# Patient Record
Sex: MALE | Race: WHITE
[De-identification: names, ages, dates, MRNs, and addresses within clinical notes are randomized per-mention and may not be internally consistent; named-entity substitution may affect disease eponyms.]

---

## 2018-02-26 ENCOUNTER — HOSPITAL ENCOUNTER (EMERGENCY)
Dept: HOSPITAL 45 - C.EDB | Age: 73
LOS: 1 days | Discharge: HOME | End: 2018-02-27
Payer: COMMERCIAL

## 2018-02-26 VITALS
WEIGHT: 170.64 LBS | BODY MASS INDEX: 27.42 KG/M2 | WEIGHT: 170.64 LBS | HEIGHT: 65.98 IN | BODY MASS INDEX: 27.42 KG/M2 | HEIGHT: 65.98 IN

## 2018-02-26 VITALS — OXYGEN SATURATION: 94 %

## 2018-02-26 VITALS — TEMPERATURE: 97.52 F

## 2018-02-26 DIAGNOSIS — R05: ICD-10-CM

## 2018-02-26 DIAGNOSIS — R09.02: ICD-10-CM

## 2018-02-26 DIAGNOSIS — R07.9: ICD-10-CM

## 2018-02-26 DIAGNOSIS — R06.02: ICD-10-CM

## 2018-02-26 DIAGNOSIS — Z87.891: ICD-10-CM

## 2018-02-26 DIAGNOSIS — Z79.899: ICD-10-CM

## 2018-02-26 DIAGNOSIS — R00.1: ICD-10-CM

## 2018-02-26 DIAGNOSIS — I25.2: ICD-10-CM

## 2018-02-26 DIAGNOSIS — J44.1: Primary | ICD-10-CM

## 2018-02-26 DIAGNOSIS — Z88.9: ICD-10-CM

## 2018-02-26 DIAGNOSIS — Z82.49: ICD-10-CM

## 2018-02-26 DIAGNOSIS — Z95.810: ICD-10-CM

## 2018-02-26 DIAGNOSIS — Z83.3: ICD-10-CM

## 2018-02-26 DIAGNOSIS — Z79.02: ICD-10-CM

## 2018-02-26 LAB
ALBUMIN SERPL-MCNC: 3.4 GM/DL (ref 3.4–5)
ALP SERPL-CCNC: 85 U/L (ref 45–117)
ALT SERPL-CCNC: 51 U/L (ref 12–78)
AST SERPL-CCNC: 27 U/L (ref 15–37)
BASOPHILS # BLD: 0.02 K/UL (ref 0–0.2)
BASOPHILS NFR BLD: 0.2 %
BUN SERPL-MCNC: 11 MG/DL (ref 7–18)
CALCIUM SERPL-MCNC: 8.7 MG/DL (ref 8.5–10.1)
CO2 SERPL-SCNC: 29 MMOL/L (ref 21–32)
CREAT SERPL-MCNC: 1.04 MG/DL (ref 0.6–1.4)
EOS ABS #: 0.42 K/UL (ref 0–0.5)
EOSINOPHIL NFR BLD AUTO: 115 K/UL (ref 130–400)
GLUCOSE SERPL-MCNC: 68 MG/DL (ref 70–99)
HCT VFR BLD CALC: 42.2 % (ref 42–52)
HGB BLD-MCNC: 14.2 G/DL (ref 14–18)
IG#: 0.03 K/UL (ref 0–0.02)
IMM GRANULOCYTES NFR BLD AUTO: 16.1 %
INR PPP: 1.1 (ref 0.9–1.1)
LYMPHOCYTES # BLD: 1.57 K/UL (ref 1.2–3.4)
MCH RBC QN AUTO: 30.9 PG (ref 25–34)
MCHC RBC AUTO-ENTMCNC: 33.6 G/DL (ref 32–36)
MCV RBC AUTO: 91.7 FL (ref 80–100)
MONO ABS #: 0.68 K/UL (ref 0.11–0.59)
MONOCYTES NFR BLD: 7 %
NEUT ABS #: 7.03 K/UL (ref 1.4–6.5)
NEUTROPHILS # BLD AUTO: 4.3 %
NEUTROPHILS NFR BLD AUTO: 72.1 %
PMV BLD AUTO: 11.5 FL (ref 7.4–10.4)
POTASSIUM SERPL-SCNC: 3.3 MMOL/L (ref 3.5–5.1)
PROT SERPL-MCNC: 6.5 GM/DL (ref 6.4–8.2)
PTT PATIENT: 27.1 SECONDS (ref 21–31)
RED CELL DISTRIBUTION WIDTH CV: 14.5 % (ref 11.5–14.5)
RED CELL DISTRIBUTION WIDTH SD: 47 FL (ref 36.4–46.3)
SODIUM SERPL-SCNC: 139 MMOL/L (ref 136–145)
WBC # BLD AUTO: 9.75 K/UL (ref 4.8–10.8)

## 2018-02-26 NOTE — DIAGNOSTIC IMAGING REPORT
CHEST ONE VIEW PORTABLE



HISTORY: Atypical chest pain.  short of breath



COMPARISON: 11/10/2016.



FINDINGS: The cardiac silhouette is borderline enlarged. Left-sided

pacemaker/defibrillator. Mild central pulmonary vascular congestion without

overt edema. This remains unchanged. No new focal lung consolidations. No

pleural fusions. No pneumothorax.



IMPRESSION:

Mild central pulmonary vascular congestion without overt edema. This is not

significantly changed.







Electronically signed by:  Calixto Harvey M.D.

2/26/2018 9:57 PM



Dictated Date/Time:  2/26/2018 9:52 PM

## 2018-02-27 VITALS — DIASTOLIC BLOOD PRESSURE: 72 MMHG | HEART RATE: 88 BPM | OXYGEN SATURATION: 92 % | SYSTOLIC BLOOD PRESSURE: 134 MMHG

## 2018-02-27 NOTE — EMERGENCY ROOM VISIT NOTE
History


Report prepared by Yoli:  Brandon Matson


Under the Supervision of:  Dr. David Salinas M.D.


First contact with patient:  21:35


Chief Complaint:  SHORTNESS OF BREATH


Stated Complaint:  CHEST PAIN


Nursing Triage Summary:  


patient c/o increased SOB today along with cough/congestion. SOB worsens with 


activity and when lying flat. hx pacemaker/defibrillator and patient thinks he 


was diagnosed with COPD and does not use any home oxygen.





History of Present Illness


The patient is a 72 year old male who presents to the Emergency Room with 

complaints of constant SOB beginning tonight. The patient states that he is a 

former smoker but does not wear oxygen at home. He also complains of congestion 

and a cough. He notes that his chest feels tight. He reports that he has a 

nebulizer at home which typically relieves his symptoms. Per nurse, the patient 

was given an albuterol treatment upon arrival. The patient states that he is 

currently feeling better. He notes that he has a defibrillator in place and has 

a history of a heart attack. Pt denies LOC, headache, fevers, chills, 

diaphoresis, visual changes, neck pain, tearing pain radiating to the back, 

personal history or family history of aneurysm , uncontrolled hypertension, leg 

swelling, coagulation abnormalities, prolonged travel, recent surgery or 

immobilization, nausea, vomiting, abdominal pain, melena, hematochezia, urinary 

symptoms, numbness, weakness, lymphadenopathy, rash, or other complaints.





   Source of History:  patient


   Onset:  tonight


   Position:  chest


   Quality:  other (SOB, tight)


   Timing:  constant


   Modifying Factors (Relieving):  other (albuterol treatment)


   Associated Symptoms:  + cough


Note:


He also complains of congestion.





Review of Systems


See HPI for pertinent positives and negatives.  A total of ten systems were 

reviewed and were otherwise negative.





Past Medical & Surgical


Medical Problems:


(1) Acute respiratory failure


(2) Emphysema lung


(3) H/O cardiac pacemaker


(4) Heart attack


(5) Heart disease


(6) History of - hypertension








Family History





Diabetes mellitus


FH: heart disease


Hypertension





Social History


Smoking Status:  Current Every Day Smoker


Alcohol Use:  none


Drug Use:  none


Housing Status:  lives with family


Occupation Status:  employed





Current/Historical Medications


Scheduled


Carvedilol (Coreg), 25 MG PO BID


Clopidogrel (Plavix), 75 MG PO DAILY


Digoxin (Digoxin), 0.125 MG PO DAILY


Furosemide (Lasix), 20 MG PO PRN


Isosorbide Mononitrate Ext Rel (Imdur Ext Rel), 60 MG PO qam & noon


Lisinopril (Zestril), 20 MG PO daily @ 1200


Nitroglycerin (Nitrostat), 0.4 MG UT PRN


Omeprazole (Prilosec), 1 TAB PO DAILY


Prednisone (Prednisone), 50 MG PO DAILY


Rosuvastatin Calcium (Crestor), 10 MG PO DAILY





Scheduled PRN


Albuterol Sulf (Proventil 0.083% 2.5MG/3ML), 2.5 MG INH QID PRN for SOB/Wheezing





Allergies


Coded Allergies:  


     Simvastatin (Verified  Adverse Reaction, Mild, MYALGIAS, 2/5/10)


     No Known Allergies (Verified , 2/16/07)





Physical Exam


Vital Signs











  Date Time  Temp Pulse Resp B/P (MAP) Pulse Ox O2 Delivery O2 Flow Rate FiO2


 


2/27/18 00:38  88 22 134/72 92   


 


2/26/18 23:24     94 Room Air  


 


2/26/18 23:11  75 22 135/64 90 Room Air  


 


2/26/18 23:08  53      


 


2/26/18 22:36     93 Room Air  


 


2/26/18 22:09     91 Room Air  


 


2/26/18 22:08     89 Room Air  


 


2/26/18 21:56  57 20 127/84 92 Room Air  


 


2/26/18 21:33     93 Room Air  


 


2/26/18 21:10     93 Room Air  


 


2/26/18 20:57 36.4 56 20 126/69 93 Room Air  











Physical Exam


GENERAL: Awake, alert,  dyspneic appearing, in no distress


HENT: Normocephalic, atraumatic. Oropharynx unremarkable.


EYES: Normal conjunctiva. Sclera non-icteric.


NECK: Supple. No nuchal rigidity. FROM. No masses.


RESPIRATORY: Clear to auscultation. No wheezes. Scattered rhonchi.


CARDIAC: Borderline bradycardic.  Normal rhythm. No murmurs.  No rubs. 

Extremities warm and well perfused. Pulses equal.  No JVD.


GI: Soft, non-distended. No tenderness to palpation. No rebound or guarding. No 

masses.


RECTAL: Deferred.


MUSCULOSKELETAL: Atraumatic. Chest examination reveals no tenderness. The back 

is symmetrical on inspection without obvious abnormality. There is no CVA 

tenderness to palpation. No joint edema. 


LOWER EXTREMITIES: Calves are equal size bilaterally and non-tender. No edema. 

No discoloration. 


NEURO: Normal sensorium. No sensory or motor deficits noted. 


SKIN: No rash or jaundice noted.





Medical Decision & Procedures


ER Provider


Diagnostic Interpretation:


Radiology results as stated below per my review and radiologist interpretation: 





CHEST ONE VIEW PORTABLE





HISTORY: Atypical chest pain.  short of breath





COMPARISON: 11/10/2016.





FINDINGS: The cardiac silhouette is borderline enlarged. Left-sided


pacemaker/defibrillator. Mild central pulmonary vascular congestion without


overt edema. This remains unchanged. No new focal lung consolidations. No


pleural fusions. No pneumothorax.





IMPRESSION:


Mild central pulmonary vascular congestion without overt edema. This is not


significantly changed.





Electronically signed by:  Calixto Harvey M.D.


2/26/2018 9:57 PM





Laboratory Results


2/26/18 21:24








Red Blood Count 4.60, Mean Corpuscular Volume 91.7, Mean Corpuscular Hemoglobin 

30.9, Mean Corpuscular Hemoglobin Concent 33.6, Mean Platelet Volume 11.5, 

Neutrophils (%) (Auto) 72.1, Lymphocytes (%) (Auto) 16.1, Monocytes (%) (Auto) 

7.0, Eosinophils (%) (Auto) 4.3, Basophils (%) (Auto) 0.2, Neutrophils # (Auto) 

7.03, Lymphocytes # (Auto) 1.57, Monocytes # (Auto) 0.68, Eosinophils # (Auto) 

0.42, Basophils # (Auto) 0.02





2/26/18 21:24








2/26/18 22:45

















Test


  2/26/18


21:24 2/26/18


21:37 2/26/18


22:45 2/26/18


23:23


 


White Blood Count


  9.75 K/uL


(4.8-10.8) 


  


  


 


 


Red Blood Count


  4.60 M/uL


(4.7-6.1) 


  


  


 


 


Hemoglobin


  14.2 g/dL


(14.0-18.0) 


  


  


 


 


Hematocrit 42.2 % (42-52)    


 


Mean Corpuscular Volume


  91.7 fL


() 


  


  


 


 


Mean Corpuscular Hemoglobin


  30.9 pg


(25-34) 


  


  


 


 


Mean Corpuscular Hemoglobin


Concent 33.6 g/dl


(32-36) 


  


  


 


 


Platelet Count


  115 K/uL


(130-400) 


  


  


 


 


Mean Platelet Volume


  11.5 fL


(7.4-10.4) 


  


  


 


 


Neutrophils (%) (Auto) 72.1 %    


 


Lymphocytes (%) (Auto) 16.1 %    


 


Monocytes (%) (Auto) 7.0 %    


 


Eosinophils (%) (Auto) 4.3 %    


 


Basophils (%) (Auto) 0.2 %    


 


Neutrophils # (Auto)


  7.03 K/uL


(1.4-6.5) 


  


  


 


 


Lymphocytes # (Auto)


  1.57 K/uL


(1.2-3.4) 


  


  


 


 


Monocytes # (Auto)


  0.68 K/uL


(0.11-0.59) 


  


  


 


 


Eosinophils # (Auto)


  0.42 K/uL


(0-0.5) 


  


  


 


 


Basophils # (Auto)


  0.02 K/uL


(0-0.2) 


  


  


 


 


RDW Standard Deviation


  47.0 fL


(36.4-46.3) 


  


  


 


 


RDW Coefficient of Variation


  14.5 %


(11.5-14.5) 


  


  


 


 


Immature Granulocyte % (Auto) 0.3 %    


 


Immature Granulocyte # (Auto)


  0.03 K/uL


(0.00-0.02) 


  


  


 


 


Prothrombin Time


  11.1 SECONDS


(9.0-12.0) 


  


  


 


 


Prothromb Time International


Ratio 1.1 (0.9-1.1) 


  


  


  


 


 


Activated Partial


Thromboplast Time 27.1 SECONDS


(21.0-31.0) 


  


  


 


 


Partial Thromboplastin Ratio 1.0    


 


Anion Gap


  5.0 mmol/L


(3-11) 


  


  


 


 


Est Creatinine Clear Calc


Drug Dose 63.8 ml/min 


  


  


  


 


 


Estimated GFR (


American) 83.3 


  


  


  


 


 


Estimated GFR (Non-


American 71.9 


  


  


  


 


 


BUN/Creatinine Ratio 10.4 (10-20)    


 


Calcium Level


  8.7 mg/dl


(8.5-10.1) 


  


  


 


 


Total Bilirubin


  0.5 mg/dl


(0.2-1) 


  


  


 


 


Alanine Aminotransferase


(ALT/SGPT) 51 U/L (12-78) 


  


  


  


 


 


Alkaline Phosphatase


  85 U/L


() 


  


  


 


 


Total Protein


  6.5 gm/dl


(6.4-8.2) 


  


  


 


 


Albumin


  3.4 gm/dl


(3.4-5.0) 


  


  


 


 


Globulin


  3.1 gm/dl


(2.5-4.0) 


  


  


 


 


Albumin/Globulin Ratio 1.1 (0.9-2)    


 


Bedside Troponin I


  


  < 0.030 ng/ml


(0-0.045) 


  


 


 


Aspartate Amino Transf


(AST/SGOT) 


  


  27 U/L (15-37) 


  


 


 


Bedside Glucose


  


  


  


  82 mg/dl


(70-99)





Laboratory results reviewed by me





Medications Administered











 Medications


  (Trade)  Dose


 Ordered  Sig/Rhys


 Route  Start Time


 Stop Time Status Last Admin


Dose Admin


 


 Albuterol Sulfate


  (Ventolin 0.083%


 2.5MG/3ML Neb)  2.5 mg  NOW  STAT


 INH  2/26/18 21:20


 2/26/18 21:26 DC 2/26/18 21:32


2.5 MG


 


 Methylprednisolone


 Sodium Succinate


  (Solu-Medrol IV)  125 mg  NOW  STAT


 IV  2/26/18 22:06


 2/26/18 22:07 DC 2/26/18 22:11


125 MG


 


 Albuterol/


 Ipratropium


  (Duoneb)  3 ml  NOW  STAT


 INH  2/26/18 22:06


 2/26/18 22:07 DC 2/26/18 22:11


3 ML


 


 Albuterol/


 Ipratropium


  (Duoneb)  3 ml  NOW  STAT


 INH  2/26/18 23:01


 2/26/18 23:02 DC 2/26/18 23:11


3 ML


 


 Prednisone


  (PredniSONE TAB)  60 mg  NOW  STAT


 PO  2/26/18 23:26


 2/26/18 23:27 DC 2/26/18 23:47


60 MG











ECG Per My Interpretation


Indication:  SOB/dyspnea


Rate (beats per minute):  52


Rhythm:  sinus bradycardia


Findings:  Q waves (Anterioseptal), RBBB, no ectopy, other (Left anterior 

fasicular block)





ED Course


2120: Albuterol Sulfate 2.5mg INH





2202: The patient was evaluated in room A9. A complete history and physical 

exam was performed.





2206: DuoNeb 3ml INH, Methylprednisolone Sodium Succinate 125mg IV





2220: I reevaluated the patient. He refuses admission.





2301: DuoNeb 3ml INH





2303: I rechecked the patient. He is 91% on room air. He is getting another 

nebulizer treatment.





2326: Prednisone 60mg PO





0018: I reevaluated and updated the patient. He feels significantly better and 

would like to go home.





0104: I reevaluated the patient. Discussed results and discharge instructions: 

he verbalized understanding and agreement. The patient is ready for discharge.





Medical Decision


Triage Nursing notes reviewed.


The patient's presentation and history were concerning for breathing difficulty.





 Etiologies such as  pneumonia, COPD, reactive airway disease, CHF, cardiac 

ischemia, pulmonary embolism, pneumothorax, musculoskeletal, infections, 

gastrointestinal, as well as others were entertained.  





The patient was evaluated.  He was requiring supplemental oxygen.  He was given 

immobilizer treatments and IV Solu Medrol.  He was feeling much better with 

this.  His blood work was unremarkable.  ECG was negative.  Being that he is 

not oxygen dependent I discussed treatment in the hospital.  The patient 

declined.  He does not want to be admitted.  He would like to have a prednisone 

prescription and follow-up as an outpatient.  He has a nebulizer at home.  

Prior to discharge the patient was maintaining his saturations in the low 90s 

on room air.  I did discuss the risks and benefits about refusing admission.  I 

gave my usual and customary discussion regarding this issue.





The patient has demonstrated no significant defect in the decision-making 

capacity to make choices.  The encounter had a good level of communication with 

language the patient can easily understand.  I feel trust was present and 

conveyed that our action/intentions were the best interest of the patient.  The 

patient was given all relevant information and reiterated the explained risks 

and benefits.  The patient explained the reasoning for refusing treatment 

clearly.  The patient possesses and expresses a set of values and goals, the 

ability to communicate and understand, and an ability to reason and deliberate.

  Despite acting emphatically, attentively and with the utmost patient's the 

patient declined further treatment.  I offered options, negotiated, and 

explored every reasonable choice.  I must respect the patient's autonomy and 

that they feel that their choices are best for them despite the associated 

risks of leaving AGAINST MEDICAL ADVICE.











Medication Reconcilliation


Current Medication List:  was personally reviewed by me





Blood Pressure Screening


Patient's blood pressure:  Elevated blood pressure


Blood pressure disposition:  Elevated BP felt to be situational





Impression





 Primary Impression:  


 COPD exacerbation


 Additional Impressions:  


 Hypoxia


 Left against medical advice





Scribe Attestation


The scribe's documentation has been prepared under my direction and personally 

reviewed by me in its entirety. I confirm that the note above accurately 

reflects all work, treatment, procedures, and medical decision making performed 

by me.





Departure Information


Dispostion


Home / Self-Care





Prescriptions





Prednisone (Prednisone) 50 Mg Tab


50 MG PO DAILY for 4 Days, #4 TAB


   Prov: David Salinas MD         2/27/18





Referrals


Miri Longoria D.O. (PCP)





Forms


HOME CARE DOCUMENTATION FORM,                                                 

               IMPORTANT VISIT INFORMATION





Patient Instructions


My [facility]





Additional Instructions





Albuterol inhaler or nebulizer four times daily for five days, then as needed.





Prednisone 50mg:  Once daily until the prescription is finished.  It is best to 

take this earlier in the day as some patients note occasional difficulty 

falling asleep when taken in the late evening.





Acetaminophen(Tylenol) may be used for fever or pain.  Use 1000mg every six 

hours as needed.  Avoid using more than 4000mg in a 24 hour period.





Rest and drink plenty of fluids. 





Avoid smoke/smoking, fumes, dust, or any triggers in the past that may have 

affected your breathing.





Continue current medications.





Return to the ER for chest pain, difficulty breathing, fevers, vomiting, 

worsening of your condition, or as needed.





Follow up with your primary physician this week for a recheck of your current 

condition.





Problem Qualifiers

## 2018-03-02 ENCOUNTER — HOSPITAL ENCOUNTER (INPATIENT)
Dept: HOSPITAL 45 - C.EDB | Age: 73
LOS: 1 days | Discharge: HOME | DRG: 190 | End: 2018-03-03
Attending: HOSPITALIST | Admitting: HOSPITALIST
Payer: COMMERCIAL

## 2018-03-02 VITALS
SYSTOLIC BLOOD PRESSURE: 141 MMHG | HEART RATE: 68 BPM | DIASTOLIC BLOOD PRESSURE: 65 MMHG | TEMPERATURE: 98.42 F | OXYGEN SATURATION: 93 %

## 2018-03-02 VITALS
HEIGHT: 66 IN | BODY MASS INDEX: 24.41 KG/M2 | WEIGHT: 151.9 LBS | WEIGHT: 151.9 LBS | BODY MASS INDEX: 24.41 KG/M2 | BODY MASS INDEX: 24.41 KG/M2 | HEIGHT: 66 IN

## 2018-03-02 VITALS
SYSTOLIC BLOOD PRESSURE: 136 MMHG | DIASTOLIC BLOOD PRESSURE: 71 MMHG | OXYGEN SATURATION: 93 % | HEART RATE: 63 BPM | TEMPERATURE: 98.06 F

## 2018-03-02 VITALS
SYSTOLIC BLOOD PRESSURE: 118 MMHG | HEART RATE: 64 BPM | DIASTOLIC BLOOD PRESSURE: 65 MMHG | OXYGEN SATURATION: 95 % | TEMPERATURE: 97.88 F

## 2018-03-02 VITALS — HEART RATE: 57 BPM | SYSTOLIC BLOOD PRESSURE: 143 MMHG | TEMPERATURE: 97.34 F | DIASTOLIC BLOOD PRESSURE: 68 MMHG

## 2018-03-02 VITALS — OXYGEN SATURATION: 91 %

## 2018-03-02 VITALS — OXYGEN SATURATION: 93 %

## 2018-03-02 VITALS — HEART RATE: 79 BPM | OXYGEN SATURATION: 93 %

## 2018-03-02 DIAGNOSIS — I25.2: ICD-10-CM

## 2018-03-02 DIAGNOSIS — E78.5: ICD-10-CM

## 2018-03-02 DIAGNOSIS — F17.200: ICD-10-CM

## 2018-03-02 DIAGNOSIS — T38.0X5A: ICD-10-CM

## 2018-03-02 DIAGNOSIS — D69.3: ICD-10-CM

## 2018-03-02 DIAGNOSIS — I25.5: ICD-10-CM

## 2018-03-02 DIAGNOSIS — E87.6: ICD-10-CM

## 2018-03-02 DIAGNOSIS — J44.1: Primary | ICD-10-CM

## 2018-03-02 DIAGNOSIS — Z79.899: ICD-10-CM

## 2018-03-02 DIAGNOSIS — Z95.5: ICD-10-CM

## 2018-03-02 DIAGNOSIS — R09.02: ICD-10-CM

## 2018-03-02 DIAGNOSIS — I11.0: ICD-10-CM

## 2018-03-02 DIAGNOSIS — I50.43: ICD-10-CM

## 2018-03-02 DIAGNOSIS — Z95.810: ICD-10-CM

## 2018-03-02 DIAGNOSIS — Z83.3: ICD-10-CM

## 2018-03-02 DIAGNOSIS — Z82.49: ICD-10-CM

## 2018-03-02 DIAGNOSIS — Z88.8: ICD-10-CM

## 2018-03-02 LAB
ALBUMIN SERPL-MCNC: 3 GM/DL (ref 3.4–5)
ALP SERPL-CCNC: 76 U/L (ref 45–117)
ALT SERPL-CCNC: 67 U/L (ref 12–78)
AST SERPL-CCNC: 45 U/L (ref 15–37)
BASOPHILS # BLD: 0 K/UL (ref 0–0.2)
BASOPHILS NFR BLD: 0 %
BUN SERPL-MCNC: 19 MG/DL (ref 7–18)
CALCIUM SERPL-MCNC: 8.4 MG/DL (ref 8.5–10.1)
CO2 SERPL-SCNC: 29 MMOL/L (ref 21–32)
CREAT SERPL-MCNC: 1.17 MG/DL (ref 0.6–1.4)
EOS ABS #: 0.01 K/UL (ref 0–0.5)
EOSINOPHIL NFR BLD AUTO: 113 K/UL (ref 130–400)
GLUCOSE SERPL-MCNC: 80 MG/DL (ref 70–99)
HCT VFR BLD CALC: 40.8 % (ref 42–52)
HGB BLD-MCNC: 13.8 G/DL (ref 14–18)
IG#: 0.05 K/UL (ref 0–0.02)
IMM GRANULOCYTES NFR BLD AUTO: 6.5 %
INR PPP: 1.1 (ref 0.9–1.1)
LYMPHOCYTES # BLD: 0.64 K/UL (ref 1.2–3.4)
MCH RBC QN AUTO: 31.3 PG (ref 25–34)
MCHC RBC AUTO-ENTMCNC: 33.8 G/DL (ref 32–36)
MCV RBC AUTO: 92.5 FL (ref 80–100)
MONO ABS #: 0.63 K/UL (ref 0.11–0.59)
MONOCYTES NFR BLD: 6.4 %
NEUT ABS #: 8.45 K/UL (ref 1.4–6.5)
NEUTROPHILS # BLD AUTO: 0.1 %
NEUTROPHILS NFR BLD AUTO: 86.5 %
PMV BLD AUTO: 11.4 FL (ref 7.4–10.4)
POTASSIUM SERPL-SCNC: 3.3 MMOL/L (ref 3.5–5.1)
POTASSIUM SERPL-SCNC: 3.3 MMOL/L (ref 3.5–5.1)
PROT SERPL-MCNC: 5.8 GM/DL (ref 6.4–8.2)
PTT PATIENT: 25.5 SECONDS (ref 21–31)
RED CELL DISTRIBUTION WIDTH CV: 15.1 % (ref 11.5–14.5)
RED CELL DISTRIBUTION WIDTH SD: 50.7 FL (ref 36.4–46.3)
SODIUM SERPL-SCNC: 143 MMOL/L (ref 136–145)
WBC # BLD AUTO: 9.78 K/UL (ref 4.8–10.8)

## 2018-03-02 RX ADMIN — ALBUTEROL SULFATE SCH MG: 2.5 SOLUTION RESPIRATORY (INHALATION) at 15:24

## 2018-03-02 RX ADMIN — NITROGLYCERIN SCH INCH: 20 OINTMENT TOPICAL at 14:00

## 2018-03-02 RX ADMIN — CARVEDILOL SCH MG: 25 TABLET, FILM COATED ORAL at 20:30

## 2018-03-02 RX ADMIN — LISINOPRIL SCH MG: 20 TABLET ORAL at 17:41

## 2018-03-02 RX ADMIN — ALBUTEROL SULFATE SCH MG: 2.5 SOLUTION RESPIRATORY (INHALATION) at 19:16

## 2018-03-02 RX ADMIN — Medication SCH MG: at 20:30

## 2018-03-02 RX ADMIN — NITROGLYCERIN SCH INCH: 20 OINTMENT TOPICAL at 20:19

## 2018-03-02 RX ADMIN — POTASSIUM CHLORIDE SCH MEQ: 1500 TABLET, EXTENDED RELEASE ORAL at 20:30

## 2018-03-02 RX ADMIN — GUAIFENESIN SCH MG: 600 TABLET, EXTENDED RELEASE ORAL at 20:30

## 2018-03-02 RX ADMIN — ALBUTEROL SULFATE SCH MG: 2.5 SOLUTION RESPIRATORY (INHALATION) at 15:25

## 2018-03-02 NOTE — EMERGENCY ROOM VISIT NOTE
History


Report prepared by Yoli:  Faustino Uribe


Under the Supervision of:  Dr. Blas Tanner D.O.


First contact with patient:  09:05


Chief Complaint:  SHORTNESS OF BREATH


Stated Complaint:  TROUBLE BREATHING


Nursing Triage Summary:  


Pt states seen here Mon night. Pt states hx of COPD. Sent home on prednisone 

and 


has been using neb. This morning approx 0400 breathing worsened when he went 

out 


to feed the horses.





History of Present Illness


The patient is a 72 year old male who presents to the Emergency Room with 

complaints of chest pain and shortness of breath.  The patient was seen in our 

facility last week for similar complaints.  At that time he was diagnosed with 

a COPD exacerbation.  He was started on steroids which he took the last dose of 

today.  He states his symptoms were significantly improved however last night 

into this morning he started having worsening symptoms again.  He complains of 

cough which is nonproductive.  He states his symptoms are moderate especially 

with any exertion.  He denies any orthopnea or hemoptysis.  He denies having 

any leg swelling but states he had some thigh pain last evening across the 

right thigh but this pain is gone now.  He does not have a history of venous 

thromboembolic disease.  He did not see his primary care physician today.  He 

states that he used his inhaler today.  He states that this helped his symptoms 

somewhat.  He does continue to smoke tobacco products at this time.





When the patient was at our facility last week the emergency department 

physician who evaluated him recommended admission because of his fluctuations 

in oxygen saturation.  The patient did not wish to stay in the hospital at that 

time and I do believe signed out AGAINST MEDICAL ADVICE.





   Source of History:  patient


   Onset:  Last week


   Position:  chest


   Timing:  other (Persistent)


   Associated Symptoms:  + SOB





Review of Systems


See HPI for pertinent positives & negatives. A total of 10 systems reviewed and 

were otherwise negative.





Past Medical & Surgical


Medical Problems:


(1) Acute on chronic systolic (congestive) heart failure


(2) Acute respiratory failure


(3) Emphysema lung


(4) H/O cardiac pacemaker


(5) Heart attack


(6) Heart disease


(7) History of - hypertension








Family History





Diabetes mellitus


FH: heart disease


Hypertension





Social History


Smoking Status:  Current Every Day Smoker


Alcohol Use:  none


Drug Use:  none


Housing Status:  lives with family


Occupation Status:  employed





Current/Historical Medications


Scheduled


Carvedilol (Coreg), 25 MG PO BID


Clopidogrel (Plavix), 75 MG PO DAILY


Digoxin (Digoxin), 0.125 MG PO DAILY


Furosemide (Lasix), 20 MG PO PRN


Isosorbide Mononitrate Ext Rel (Imdur Ext Rel), 60 MG PO qam & noon


Lisinopril (Zestril), 20 MG PO daily @ 1200


Nitroglycerin (Nitrostat), 0.4 MG UT PRN


Omeprazole (Prilosec), 1 TAB PO DAILY


Rosuvastatin Calcium (Crestor), 10 MG PO DAILY





Scheduled PRN


Albuterol Sulf (Proventil 0.083% 2.5MG/3ML), 2.5 MG INH QID PRN for SOB/Wheezing





Allergies


Coded Allergies:  


     Simvastatin (Verified  Adverse Reaction, Mild, MYALGIAS, 3/2/18)





Physical Exam


Vital Signs











  Date Time  Temp Pulse Resp B/P (MAP) Pulse Ox O2 Delivery O2 Flow Rate FiO2


 


3/2/18 11:11  67 18 166/82 93 Nasal Cannula 2.0 


 


3/2/18 10:11     94 Nasal Cannula 2.0 


 


3/2/18 10:10     88 Room Air  


 


3/2/18 09:29  65      


 


3/2/18 09:25     95 Room Air  


 


3/2/18 09:01 36.9 72 20 160/81 94 Room Air  











Physical Exam


GENERAL:  Patient is awake alert in no acute distress patient is resting 

comfortably and showing no signs of anxiety


EYES: The conjunctivae are clear.  The pupils are round and reactive. 


EARS, NOSE, MOUTH AND THROAT: The nose is without any evidence of any 

deformity. Mucous membranes are moist tongue is midline 


NECK: The neck is nontender and supple.


RESPIRATORY: No conversational dyspnea or tachypnea was noted.  There are 

diminished breath sounds noted throughout.  There are expiratory wheezing in 

both upper lung fields.  No retractions were noted.


CARDIOVASCULAR:  Regular rate and rhythm noted there no murmurs rubs or gallops 

normal S1 normal S2 


GASTROINTESTINAL: The abdomen is soft. Bowel sounds are present in all 

quadrants. Abdomen is nontender


MUSCULOSKELETAL/EXTREMITIES: There is no evidence of gross deformity full range 

of motion is noted in the hips and shoulders


SKIN: There is no obvious evidence of any rash.  There is no calf tenderness.


NEUROLOGIC:  Patient is awake alert and oriented x3.  Gait was steady.





Medical Decision & Procedures


ER Provider


Diagnostic Interpretation:


Radiology results as stated below per my review and radiologist interpretation:





CT ANGIOGRAM OF THE CHEST





CLINICAL HISTORY: Atypical chest pain and shortness of breath    





COMPARISON STUDY:  Chest x-ray dated 3-18, CT scan dated 11/10/2016 





TECHNIQUE: Following the IV administration of 94 mL of Optiray-320, CT angiogram


of the thorax was performed from the thoracic inlet to the lung bases utilizing


the pulmonary embolus protocol. Images are reviewed in the axial, sagittal, and


coronal planes. IV contrast was administered without complication. MIP imaging


was performed.  A dose lowering technique was utilized adhering to the


principles of ALARA.








CT DOSE: 315.18 mGy.cm





FINDINGS:





There are mildly enlarged paratracheal, AP window, subcarinal, and right hilar


lymph nodes.





There was no evidence of thoracic aortic dilatation. There are coronary artery


calcifications present.





There were no pulmonary artery filling defects to indicate acute pulmonary


embolism.





There are small bilateral pleural effusions





There is moderate respiratory motion artifact. There is interlobular septal


edema. There is mild perihilar bronchial wall thickening. There are no areas of


consolidation to indicate a pneumonia.





IMPRESSION:  


1. Examination limited due to respiratory motion artifact


2. No evidence of acute pulmonary embolism


3. CT findings suggestive of pulmonary edema with small bilateral pleural


effusions and interlobular septal edema


4. Mild perihilar bronchial wall thickening


5. Mild mediastinal and right hilar adenopathy











Electronically signed by:  Puneet Parish M.D.


3/2/2018 10:49 AM





Dictated Date/Time:  3/2/2018 10:45 AM











CHEST ONE VIEW PORTABLE





CLINICAL HISTORY: EVALUATE RESPIRATORY DISTRESS.DYSPNEA    





COMPARISON STUDY:  Chest radiograph February 26, 2018.





FINDINGS: A single lead left subclavian pacer/AICD is in place. There is no


pneumothorax. There are trace bilateral pleural effusions. Interstitial edema


has developed since previous exam of February 26, 2018. Moderate cardiomegaly is


noted. 





IMPRESSION: Interval development of interstitial pulmonary edema. Trace


bilateral pleural effusions.











Electronically signed by:  Jose Carlos Vallejo M.D.


3/2/2018 9:40 AM





Dictated Date/Time:  3/2/2018 9:38 AM





Laboratory Results


3/2/18 09:20








Red Blood Count 4.41, Mean Corpuscular Volume 92.5, Mean Corpuscular Hemoglobin 

31.3, Mean Corpuscular Hemoglobin Concent 33.8, Mean Platelet Volume 11.4, 

Neutrophils (%) (Auto) 86.5, Lymphocytes (%) (Auto) 6.5, Monocytes (%) (Auto) 

6.4, Eosinophils (%) (Auto) 0.1, Basophils (%) (Auto) 0.0, Neutrophils # (Auto) 

8.45, Lymphocytes # (Auto) 0.64, Monocytes # (Auto) 0.63, Eosinophils # (Auto) 

0.01, Basophils # (Auto) 0.00





3/2/18 09:20

















Test


  3/2/18


09:20 3/2/18


09:35 3/2/18


11:50


 


White Blood Count


  9.78 K/uL


(4.8-10.8) 


  


 


 


Red Blood Count


  4.41 M/uL


(4.7-6.1) 


  


 


 


Hemoglobin


  13.8 g/dL


(14.0-18.0) 


  


 


 


Hematocrit 40.8 % (42-52)   


 


Mean Corpuscular Volume


  92.5 fL


() 


  


 


 


Mean Corpuscular Hemoglobin


  31.3 pg


(25-34) 


  


 


 


Mean Corpuscular Hemoglobin


Concent 33.8 g/dl


(32-36) 


  


 


 


Platelet Count


  113 K/uL


(130-400) 


  


 


 


Mean Platelet Volume


  11.4 fL


(7.4-10.4) 


  


 


 


Neutrophils (%) (Auto) 86.5 %   


 


Lymphocytes (%) (Auto) 6.5 %   


 


Monocytes (%) (Auto) 6.4 %   


 


Eosinophils (%) (Auto) 0.1 %   


 


Basophils (%) (Auto) 0.0 %   


 


Neutrophils # (Auto)


  8.45 K/uL


(1.4-6.5) 


  


 


 


Lymphocytes # (Auto)


  0.64 K/uL


(1.2-3.4) 


  


 


 


Monocytes # (Auto)


  0.63 K/uL


(0.11-0.59) 


  


 


 


Eosinophils # (Auto)


  0.01 K/uL


(0-0.5) 


  


 


 


Basophils # (Auto)


  0.00 K/uL


(0-0.2) 


  


 


 


RDW Standard Deviation


  50.7 fL


(36.4-46.3) 


  


 


 


RDW Coefficient of Variation


  15.1 %


(11.5-14.5) 


  


 


 


Immature Granulocyte % (Auto) 0.5 %   


 


Immature Granulocyte # (Auto)


  0.05 K/uL


(0.00-0.02) 


  


 


 


Prothrombin Time


  11.1 SECONDS


(9.0-12.0) 


  


 


 


Prothromb Time International


Ratio 1.1 (0.9-1.1) 


  


  


 


 


Activated Partial


Thromboplast Time 25.5 SECONDS


(21.0-31.0) 


  


 


 


Partial Thromboplastin Ratio 1.0   


 


Anion Gap


  5.0 mmol/L


(3-11) 


  


 


 


Est Creatinine Clear Calc


Drug Dose 58.1 ml/min 


  


  


 


 


Estimated GFR (


American) 72.3 


  


  


 


 


Estimated GFR (Non-


American 62.4 


  


  


 


 


BUN/Creatinine Ratio 16.2 (10-20)   


 


Calcium Level


  8.4 mg/dl


(8.5-10.1) 


  


 


 


Magnesium Level


  2.0 mg/dl


(1.8-2.4) 


  


 


 


Total Bilirubin


  0.4 mg/dl


(0.2-1) 


  


 


 


Aspartate Amino Transf


(AST/SGOT) 45 U/L (15-37) 


  


  


 


 


Alanine Aminotransferase


(ALT/SGPT) 67 U/L (12-78) 


  


  


 


 


Alkaline Phosphatase


  76 U/L


() 


  


 


 


Troponin I


  < 0.015 ng/ml


(0-0.045) 


  


 


 


Pro-B-Type Natriuretic Peptide


  7865 pg/ml


(0-900) 


  


 


 


Total Protein


  5.8 gm/dl


(6.4-8.2) 


  


 


 


Albumin


  3.0 gm/dl


(3.4-5.0) 


  


 


 


Globulin


  2.8 gm/dl


(2.5-4.0) 


  


 


 


Albumin/Globulin Ratio 1.1 (0.9-2)   


 


Bedside D-Dimer


  


  > 450 ng/mlFEU


(0-450) 


 


 


Digoxin Level


  


  


  0.7 ng/ml


(0.8-2.0)





Laboratory results per my review.





Medications Administered











 Medications


  (Trade)  Dose


 Ordered  Sig/Rhys


 Route  Start Time


 Stop Time Status Last Admin


Dose Admin


 


 Methylprednisolone


 Sodium Succinate


  (Solu-Medrol IV)  125 mg  NOW  STAT


 IV  3/2/18 09:09


 3/2/18 09:21 DC 3/2/18 09:24


125 MG


 


 Albuterol/


 Ipratropium


  (Duoneb)  3 ml  NOW  STAT


 INH  3/2/18 09:11


 3/2/18 09:21 DC 3/2/18 09:24


3 ML


 


 Furosemide


  (Lasix Inj)  40 mg  NOW  STAT


 IV  3/2/18 11:05


 3/2/18 11:06 DC 3/2/18 11:11


40 MG











ED Course


0907: The patient was evaluated in room C3. A complete history and physical 

examination were performed. 





1010: I checked on the patient at this time. 





1107: I revaluated the patient he is resting in bed. 





1117: I discussed the case with Dr. Savannah Franks. He will 

evaluate the patient for further treatment.





Medical Decision


Prior records/ancillary studies reviewed.


Triage Nursing notes reviewed.





The patient's history was concerning for respiratory difficulties.





Differential diagnosis:


Etiologies such as infections, reactive airway disease, pneumonia, pneumothorax

, COPD, CHF, cardiac ischemia, pulmonary embolism, musculoskeletal, 

gastrointestinal, as well as others were entertained.





The patient is a 72-year-old male who presented to the emergency department for 

an evaluation of shortness of breath.  The patient has a history of COPD and 

had wheezing initially on my physical exam.  He also appears to have worsening 

pulmonary edema noted on chest x-ray as well as laboratory studies that would 

suggest this.  The patient was treated with bronchodilator therapy IV steroids 

and IV Lasix.  On subsequent reevaluation he was somewhat improved but his 

oxygen saturation was low.  The patient signed out AGAINST MEDICAL ADVICE on 

his recent visit.  I am very concerned that the patient continues to smoke and 

is not managing his health at home as well as he should.  For this reason I 

discussed his case with the on-call [facility] hospitalist group.  They have 

agreed to evaluate the patient in the emergency department for further 

management and disposition.





Medication Reconcilliation


Current Medication List:  was personally reviewed by me





Blood Pressure Screening


Patient's blood pressure:  Elevated blood pressure


Referred to Hospitalist





Consults


Time Called:  1114


Consulting Physician:  Dr. Savannah Franks


Returned Call:  1117


I discussed the case with Dr. Savannah Franks. He will evaluate 

the patient for further treatment.





Impression





 Primary Impression:  


 SOB (shortness of breath)


 Additional Impressions:  


 CHF (congestive heart failure)


 Hypoxia


 COPD exacerbation





Scribe Attestation


The scribe's documentation has been prepared under my direction and personally 

reviewed by me in its entirety. I confirm that the note above accurately 

reflects all work, treatment, procedures, and medical decision making performed 

by me.





Departure Information


Dispostion


Being Evaluated By Hospitalist





Referrals


[facility] (PCP)





Patient Instructions


My [facility]





Problem Qualifiers








 Additional Impressions:  


 CHF (congestive heart failure)


 Heart failure type:  unspecified  Heart failure chronicity:  unspecified  

Qualified Codes:  I50.9 - Heart failure, unspecified

## 2018-03-02 NOTE — ECHOCARDIOGRAM REPORT
*NOTICE TO RECEIVING PARTY AGENCY**  This information is strictly Confidential and protected under 
[place] law.  [place] law prohibits you from making any further disclosure of this 
information unless further disclosure is expressly permitted by the written consent of the person to 
whom it pertains or is authorized by law.  A general authorization for the release of medical or 
other information is not sufficient for this purpose.  Hospital accepts no responsibility if the 
information is made available to any other person, INCLUDING THE PATIENT.



Interpretation Summary

  *  Name: KLINEFELTER, KENNETH A  Study Date: 2018 02:51 PM  BP: 143/68 mmHg

  *  MRN: [id number]  Patient Location: 220  HR: 63

  *  : 1945 ([birth year]/yyyy)  Gender: Male  Height: 66 in

  *  Age: 72 yrs  Ethnicity: [place]  Weight: 180 lb

  *  Ordering Physician: Shorty Nuñez

  *  Referring Physician: Self, Referred

  *  Performed By: Winter Esquivel RDCS

  *  Accession# [id number]  Account# [id number]

  *  Reason For Study: CHF

  *  BSA: 1.9 m2

  *  -- Conclusions --

  *  The left ventricle is mildly dilated.

  *  Left ventricular systolic function is mildly reduced.

  *  Grade I diastolic dysfunction, (abnormal relaxation pattern).

  *  Moderate aortic regurgitation.

  *  Right ventricular systolic pressure is normal.

  *  Compared to study from 2013, the overall LV function appears improved

Procedure Details

  *  A contrast injection of Definity was performed to improve assessment of LV function.

  *  Contrast was injected into an intravenous site in the left arm.

  *  One vial of Definity ultrasound contrast was diluted in normal saline to a total volume of 10 
ml.  A total of '2' ml of solution was administered during imaging.

  *  Lot # [id number] of Definity utilized for procedure.

  *  Expiration date 1 .

  *  The attending nurse who injected the contrast agent was WALTER ENAMORADO RN.

Left Ventricle

  *  The left ventricle is mildly dilated.

  *  Left ventricular systolic function is mildly reduced.

  *  Ejection Fraction = 40-45%.

  *  Grade I diastolic dysfunction, (abnormal relaxation pattern).

  *  There is mild global hypokinesis with more significant hypokinesis involving the distal 
anterior wall

Right Ventricle

  *  The right ventricle is normal in size and function.

  *  There is a pacemaker lead in the right ventricle.

  *  The right ventricular systolic function is normal as assessed by tricuspid annular plane 
systolic excursion (TAPSE) (normal >1.5 cm).

Atria

  *  The left atrial size is normal.

  *  Right atrial size is normal.

Mitral Valve

  *  The mitral valve is grossly normal.

  *  Significant mitral regurgitation is absent.

Tricuspid Valve

  *  The tricuspid valve anatomy is normal.

  *  There is mild tricuspid regurgitation.

  *  Right ventricular systolic pressure is normal.

Aortic Valve

  *  The aortic valve is normal in structure and function.

  *  The aortic valve is trileaflet.

  *  No hemodynamically significant valvular aortic stenosis.

  *  Moderate aortic regurgitation.

Great Vessels

  *  The aortic root is normal size.

Pericardium/Pleural

  *  Small anterior pericardial effusion

Great Vessels

  *  Normal inferior vena cava diameter and respiratory variation suggests normal central venous 
pressure.



MMode 2D Measurements and Calculations

Ao root diam 3.5 cm

Ao root area 9.8 cm\S\2

LA dimension 3.9 cm



LA/Ao 1.1 



LVAd ap4 37.4 cm\S\2

LVLd ap4 9.6 cm

EDV(MOD-sp4) 120.7 ml

EDV(sp4-el) 124.0 ml

LVAs ap4 24.7 cm\S\2

LVLs ap4 8.5 cm

ESV(MOD-sp4) 57.8 ml

ESV(sp4-el) 61.0 ml

EF(MOD-sp4) 52.1 %

EF(sp4-el) 50.8 %



LVAd ap2 30.8 cm\S\2

LVLd ap2 9.8 cm

EDV(MOD-sp2) 85.6 ml

EDV(sp2-el) 82.2 ml

LVAs ap2 20.1 cm\S\2

LVLs ap2 8.3 cm

ESV(MOD-sp2) 41.9 ml

ESV(sp2-el) 41.3 ml

EF(MOD-sp2) 51.1 %

EF(sp2-el) 49.8 %





LVLd %diff 2.2 %

EDV(MOD-bp) 102.0 ml

LVLs %diff -2.05 %

ESV(MOD-bp) 49.1 ml

EF(MOD-bp) 51.9 %



SV(MOD-sp4) 62.9 ml

SI(MOD-sp4) 32.9 ml/m\S\2



SV(MOD-sp2) 43.7 ml

SI(MOD-sp2) 22.9 ml/m\S\2



SV(MOD-bp) 53.0 ml

SI(MOD-bp) 27.7 ml/m\S\2





SV(sp4-el) 62.9 ml

SI(sp4-el) 32.9 ml/m\S\2



SV(sp2-el) 41.0 ml

SI(sp2-el) 21.4 ml/m\S\2











Doppler Measurements and Calculations

MV E max ava 60.9 cm/sec

MV A max ava 76.8 cm/sec



MV E/A 0.79 



MV dec time 0.29 sec



Ao V2 max 176.9 cm/sec

Ao max PG 12.5 mmHg

Ao max PG (full) 3.2 mmHg

Ao V2 mean 122.4 cm/sec

Ao mean PG 6.6 mmHg

Ao mean PG (full) 1.9 mmHg

Ao V2 VTI 38.5 cm





AI max ava 405.5 cm/sec

AI max PG 65.8 mmHg

AI dec slope 180.6 cm/sec\S\2

AI P1/2t 657.7 msec



LV V1 max PG 9.4 mmHg

LV V1 mean PG 4.7 mmHg



LV V1 max 153.0 cm/sec

LV V1 mean 101.6 cm/sec

LV V1 VTI 33.4 cm



SV(Ao) 376.8 ml

SI(Ao) 197.0 ml/m\S\2





TR max ava 226.9 cm/sec

## 2018-03-02 NOTE — HISTORY AND PHYSICAL
History & Physical


Date & Time of Service:


Mar 2, 2018 at 11:26


Chief Complaint:


Trouble Breathing


Primary Care Physician:


Jaime,Leisa Outpatient


History of Present Illness


Source:  patient


71yo male with history of chronic systolic CHF & COPD who presents with ongoing/

worsening shortness of breath.  


He visited the ER at [facility] on Monday of this week, dx with COPD 

exacerbation, and was discharged home on prednisone. 


His dyspnea and cough improved with steroids but early this AM at about 0400 he 

got severely dyspneic with walking to the bathroom.  


He admits to dietary indiscretion this week eating home-cooked ham and various 

soups.  


He felt a little swollen in his abdomen today.  


He takes lasix on a PRN basis and hadn't needed any lasix for about 1 year.  





No fevers or chills.


Cough is largely nonproductive.  


He sleeps in a recliner but this is chronic.


Denies any LE edema.  





Does not require O2 at home.  


Continues to smoke - 1 ppd; started age 17.  





His cardiologist was with [facility] appweevr but his personal 

cardiologist left the practice and he hasn't seen a cardiologist in 2 years.  


Last pacemaker/ICD interrogation was about 1 month ago and he was told it "was 

normal."


Last stress test 2-3 years ago and was normal.


[facility] and was told "everything was ok.

"



Past Medical/Surgical History


PMH:


1.  CAD s/p MI - ; no events since; 2 stents


2.  COPD


3.  chronic systolic CHF - EF 30% 


4.  tobacco dependence


5.  HTN


6.  hyperlipidemia 





PSH:


1.  ICD/pacemaker insertion -  - [facility]


2.  replacement ICD -  - [facility], [place]; last interrogation about 1 month 

ago - wnl 


3.  hernia repair x 2


4.  right leg fracture s/p ORIF





Family History





Diabetes mellitus


FH: heart disease


Hypertension


mother -  from complications of cholecystectomy; CHF;  age 86 


father -  age 70; DVT/PE -  from such?





Social History


Smoking Status:  Current Every Day Smoker (1 ppd since age 17)


Smokeless Tobacco Use:  No


Alcohol Use:  none


Drug Use:  none


Marital Status:   (has 3 kids)


Housing status:  lives alone (in [place] )


Occupational Status:  retired ()





Immunizations


History of Influenza Vaccine:  Yes


Influenza Vaccine Date:  2012


History of Tetanus Vaccine?:  utd


History of Pneumococcal:  Yes


Pneumococcal Date:  2006


History of Hepatitis B Vaccine:  Unknown





Allergies


Coded Allergies:  


     Simvastatin (Verified  Adverse Reaction, Mild, MYALGIAS, 3/2/18)





Home Medications


Scheduled


Carvedilol (Coreg), 25 MG PO BID


Clopidogrel (Plavix), 75 MG PO DAILY


Digoxin (Digoxin), 0.125 MG PO DAILY


Furosemide (Lasix), 20 MG PO PRN


Isosorbide Mononitrate Ext Rel (Imdur Ext Rel), 60 MG PO qam & noon


Lisinopril (Zestril), 20 MG PO daily @ 1200


Nitroglycerin (Nitrostat), 0.4 MG UT PRN


Omeprazole (Prilosec), 1 TAB PO DAILY


Rosuvastatin Calcium (Crestor), 10 MG PO DAILY





Scheduled PRN


Albuterol Sulf (Proventil 0.083% 2.5MG/3ML), 2.5 MG INH QID PRN for SOB/Wheezing





Review of Systems


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


Eyes:  No worsening of vision


ENT:  No hearing loss, No nasal symptoms, No sore throat, No trouble swallowing


Respiratory:  + cough, + shortness of breath, + dyspnea on exertion, No 

hemoptysis


Cardiovascular:  + chest pain (more of a tightness - this AM), No orthopnea, No 

edema, No palpitations


Abdomen:  No pain, No nausea, No vomiting, No diarrhea, No constipation, No GI 

bleeding


Musculoskeletal:  + muscle pain (tops of anterior thighs)


Genitourinary - Male:  No hematuria, No dysuria


Neurologic:  No numbness/tingling


Psychiatric:  No depression symptoms, No anxiety


Endocrine:  No fatigue


Hematologic / Lymphatic:  + abnormal bleeding/bruising


Integumentary:  No rash





Physical Exam


Vital Signs











  Date Time  Temp Pulse Resp B/P (MAP) Pulse Ox O2 Delivery O2 Flow Rate FiO2


 


3/2/18 11:11  67 18 166/82 93 Nasal Cannula 2.0 


 


3/2/18 10:11     94 Nasal Cannula 2.0 


 


3/2/18 10:10     88 Room Air  


 


3/2/18 09:29  65      


 


3/2/18 09:25     95 Room Air  


 


3/2/18 09:01 36.9 72 20 160/81 94 Room Air  








General Appearance:  no apparent distress


Head:  normocephalic, atraumatic, + pertinent finding (maryam complexion, tanned

, "blue bloater" type appearance)


Eyes:  PERRL, sclerae normal


ENT:  pharynx normal, + pertinent finding (hearing aids in place b/l)


Neck:  supple, no adenopathy, thyroid normal, no carotid bruits, + JVD (mild)


Respiratory/Chest:  no respiratory distress, no accessory muscle use, + crackles

 (both bases (mild)), + wheezing (b/l)


Cardiovascular:  regular rate, rhythm, no gallop, no murmur, normal peripheral 

pulses


Abdomen/GI:  normal bowel sounds, non tender, soft, no organomegaly, no 

pulsatile mass


Back:  normal inspection


Extremities/Musculoskelatal:  + pedal edema (trace b/l)


Neurologic/Psych:  no motor/sensory deficits, alert, normal mood/affect, normal 

reflexes, oriented x 3


Skin:  + pertinent finding (again maryam/blue bloater appearance of face; 

otherwise very tanned )


Lymphatic:  no adenopathy (cervical )





Diagnostics


Laboratory Results





Results Past 24 Hours








Test


  3/2/18


09:20 3/2/18


09:35 3/2/18


11:17 Range/Units


 


 


White Blood Count 9.78   4.8-10.8  K/uL


 


Red Blood Count 4.41   4.7-6.1  M/uL


 


Hemoglobin 13.8   14.0-18.0  g/dL


 


Hematocrit 40.8   42-52  %


 


Mean Corpuscular Volume 92.5     fL


 


Mean Corpuscular Hemoglobin 31.3   25-34  pg


 


Mean Corpuscular Hemoglobin


Concent 33.8


  


  


  32-36  g/dl


 


 


Platelet Count 113   130-400  K/uL


 


Mean Platelet Volume 11.4   7.4-10.4  fL


 


Neutrophils (%) (Auto) 86.5    %


 


Lymphocytes (%) (Auto) 6.5    %


 


Monocytes (%) (Auto) 6.4    %


 


Eosinophils (%) (Auto) 0.1    %


 


Basophils (%) (Auto) 0.0    %


 


Neutrophils # (Auto) 8.45   1.4-6.5  K/uL


 


Lymphocytes # (Auto) 0.64   1.2-3.4  K/uL


 


Monocytes # (Auto) 0.63   0.11-0.59  K/uL


 


Eosinophils # (Auto) 0.01   0-0.5  K/uL


 


Basophils # (Auto) 0.00   0-0.2  K/uL


 


RDW Standard Deviation 50.7   36.4-46.3  fL


 


RDW Coefficient of Variation 15.1   11.5-14.5  %


 


Immature Granulocyte % (Auto) 0.5    %


 


Immature Granulocyte # (Auto) 0.05   0.00-0.02  K/uL


 


Prothrombin Time


  11.1


  


  


  9.0-12.0


SECONDS


 


Prothromb Time International


Ratio 1.1


  


  


  0.9-1.1  


 


 


Activated Partial


Thromboplast Time 25.5


  


  


  21.0-31.0


SECONDS


 


Partial Thromboplastin Ratio 1.0    


 


Sodium Level 143   136-145  mmol/L


 


Potassium Level 3.3   3.5-5.1  mmol/L


 


Chloride Level 109     mmol/L


 


Carbon Dioxide Level 29   21-32  mmol/L


 


Anion Gap 5.0   3-11  mmol/L


 


Blood Urea Nitrogen 19   7-18  mg/dl


 


Creatinine


  1.17


  


  


  0.60-1.40


mg/dl


 


Est Creatinine Clear Calc


Drug Dose 58.1


  


  


   ml/min


 


 


Estimated GFR (


American) 72.3


  


  


   


 


 


Estimated GFR (Non-


American 62.4


  


  


   


 


 


BUN/Creatinine Ratio 16.2   10-20  


 


Random Glucose 80   70-99  mg/dl


 


Calcium Level 8.4   8.5-10.1  mg/dl


 


Total Bilirubin 0.4   0.2-1  mg/dl


 


Aspartate Amino Transf


(AST/SGOT) 45


  


  


  15-37  U/L


 


 


Alanine Aminotransferase


(ALT/SGPT) 67


  


  


  12-78  U/L


 


 


Alkaline Phosphatase 76     U/L


 


Troponin I < 0.015   0-0.045  ng/ml


 


Pro-B-Type Natriuretic Peptide 7865   0-900  pg/ml


 


Total Protein 5.8   6.4-8.2  gm/dl


 


Albumin 3.0   3.4-5.0  gm/dl


 


Globulin 2.8   2.5-4.0  gm/dl


 


Albumin/Globulin Ratio 1.1   0.9-2  


 


Bedside D-Dimer  > 450  0-450  ng/mlFEU











Diagnostic Radiology


CTA chest - 


IMPRESSION:  


1. Examination limited due to respiratory motion artifact


2. No evidence of acute pulmonary embolism


3. CT findings suggestive of pulmonary edema with small bilateral pleural


effusions and interlobular septal edema


4. Mild perihilar bronchial wall thickening


5. Mild mediastinal and right hilar adenopathy





EKG


EKG - my reading - 


NSR


RBBB


LAFB


anterior q waves


anterior T wave inversions - these are old


no acute ST segment changes





Impression


Assessment and Plan


71yo male with ischemic cardiomyopathy with resulting chronic systolic CHF (

last known EF about 30%), CAD s/p MI, ICD/pacemaker status, COPD, and ongoing 

tobacco dependence presenting with worsening dyspnea.


Clinical picture most c/w acute/chronic systolic CHF. 








1.  acute/chronic systolic CHF - during my admission assessment he had already 

diuresed about 1 L of urine w/ 40mg IV lasix. 


Will continue diuresis; will give another 20mg IV x 1 later tonight.


Then 40mg IV once daily starting tomorrow AM.


Cont BB


Cont ACE


Repeat echo ordered to reassess LV function, valves, etc. 


Place on telemetry; strict I's and O's, daily weights, salt/fluid restrictions, 

daily BMP/mag. 





Patient needs a cardiologist - will ask [facility] to see.  





Suspect recent prednisone use led to fluid retention as well as dietary 

indiscretion.  Further he does not take lasix on a regular basis.  





2.  chest discomfort - may have been due to #1 vs uncontrolled HTN vs his known 

CAD.


Initial troponin was negative and EKG is w/o ischemic change.  


Plan for 2 additional troponins today.  





3.  hypokalemia - replace, repeat level at 1500 today.  Check mag level now and 

then daily. 





4.  COPD - I suspect that much of his wheezing today is due to #1 rather than 

the COPD itself. 


Schedule albuterol nebs q6h for symptomatic relief.  


Needs to quit smoking and will need formal O2 assessment at discharge. 





5.  CAD s/p MI - see #2 above.  Continue BB, plavix, statin.  He has not taken 

aspirin in years. 





6.  HTN - uncontrolled - patient takes lisinopril and his 2nd dose of imdur at 

noon daily. 


Will place nitropaste 1 inch now for quick BP control and give his lisinopril 

now as well.  Will schedule his afternoon imdur for bedtime while hospitalized. 





7.  DVT proph - lovenox 40mg daily. 





8.  mild thrombocytopenia - check b12, folate to be complete. 





9.  hyperlipidemia - continue his statin agent.  Check TSH as well. 





10.  tobacco dependence -  to quit.  Declines nicoderm patch today.





11.  ICD/pacer status - just had interrogation about 1 month ago and was 

normal.  His device has not discharged ever.  He has no prior h/o a. fib or 

other dysrhythmia to his recollection.





Advanced Directives


Existing Advance Directive:  No





Resuscitation Status








VTE Prophylaxis


Will order VTE Prophylaxis:  Yes





Note


total visit time about 70 minutes





Additional Copies To


[facility]

## 2018-03-02 NOTE — CARDIOLOGY CONSULTATION
Cardiology Consultation


Date of Consultation:


Mar 2, 2018.


Requesting Physician:


Savannah


Reason for Consultation:


CHF


Pt evaluation today including:  conversation w/ patient, physical exam, chart 

review, lab review, review of studies, review of inpatient medication list


History of Present Illness


Patient is a 72-year-old individual with a known history of coronary artery 

disease and ischemic cardiomyopathy who was seen at [facility] several days ago for symptoms of bronchitis.  The patient states that he 

was having some mild dyspnea and a cough.  This did not appear to be associated 

with other constitutional symptoms such as fevers or chills.  Patient states 

that he has symptoms of this nature commonly around this time of year.  

Generally he is prescribed antibiotics and steroids.  He was given a similar 

prescription a few days ago and actually felt better over the course of the 

week.  Two days ago he felt quite well and had engage in his usual physical 

activity without notable dyspnea.  Last evening however he began to be more 

short of breath to the point where he again presented to [facility].  He was thought to be in pulmonary edema and administer diuretics.  

Today he feels much better.  He states that his breathing is back to normal. He 

had an element of orthopnea previously which has resolved.  He did not notice 

significant edema either in his legs or hands.  He has not noticed any 

significant weight gain.  He did not experience symptoms of palpitations.  He 

has not had dizziness or lightheadedness.  He cannot recall suffering a 

syncopal episode.  He did have some chest discomfort associated with myocardial 

infarction in 2007.  He does not report having had a recurrence since that time.





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  He has not report 

limiting dyspnea.  He has no symptoms of exertional chest discomfort.  He takes 

Lasix intermittently when he feels that he has increased fluid.  This commonly 

manifest by a cough.  He weighs himself intermittently.





Past Medical/Surgical History


Coronary artery disease status post myocardial infarction in 2007.


Percutaneous intervention in [place] 2007


Ischemic cardiomyopathy


Hyperlipidemia


COPD





Past surgical history





Right leg fracture


Implantation of single-chamber ICD, initially 2007 then generator change 2013.  

[employer]





Family History





Diabetes mellitus


FH: heart disease


Hypertension


Noncontributory given his advanced age





Social History


Smoking Status:  Current Every Day Smoker


History of Alcohol Use:  No


Patient lives independently with his dog.  He is employed working with horses





Review of Systems


Respiratory:  + cough, + shortness of breath


Per HPI.


All Other Systems:  Reviewed and Negative





Allergies


Coded Allergies:  


     Simvastatin (Verified  Adverse Reaction, Mild, MYALGIAS, 3/2/18)





Medications





Current Inpatient Medications








 Medications


  (Trade)  Dose


 Ordered  Sig/Rhys


 Route  Start Time


 Stop Time Status Last Admin


Dose Admin


 


 Ioversol


  (Optiray 320)  100 ml  UD  PRN


 IV  3/2/18 10:15


 3/6/18 10:14   


 


 


 Enoxaparin Sodium


  (Lovenox Inj)  40 mg  Q24H


 SC  3/2/18 15:00


 4/1/18 14:59  3/2/18 16:04


40 MG


 


 Acetaminophen


  (Tylenol Tab)  650 mg  Q4H  PRN


 PO  3/2/18 12:00


 4/1/18 11:59   


 


 


 Al Hydrox/Mg


 Hydrox/Simethicone


  (Maalox Max Susp)  15 ml  Q4H  PRN


 PO  3/2/18 12:00


 4/1/18 11:59   


 


 


 Magnesium


 Hydroxide


  (Milk Of


 Magnesia Susp)  30 ml  Q12H  PRN


 PO  3/2/18 12:00


 4/1/18 11:59   


 


 


 Ondansetron HCl


  (Zofran Inj)  4 mg  Q6H  PRN


 IV  3/2/18 12:00


 4/1/18 11:59   


 


 


 Nitroglycerin


  (Nitrostat Tab)  0.4 mg  UD  PRN


 SL  3/2/18 12:00


 4/1/18 11:59   


 


 


 Nitroglycerin


  (Nitroglycerin


 2% Oint)  1 inch  Q6H


 EXT  3/2/18 14:00


 4/1/18 13:59  3/2/18 20:19


1 INCH


 


 Morphine Sulfate


  (MoRPHine


 SULFATE INJ)  2 mg  Q30M  PRN


 IV  3/2/18 12:00


 3/16/18 11:59   


 


 


 Polyethylene


  (Miralax Powder


 Packet)  17 gm  DAILY  PRN


 PO  3/2/18 12:00


 4/1/18 11:59   


 


 


 Albuterol Sulfate


  (Ventolin 0.083%


 2.5MG/3ML Neb)  2.5 mg  QIDR


 INH  3/2/18 16:00


 4/1/18 15:59  3/2/18 19:16


2.5 MG


 


 Carvedilol


  (Coreg Tab)  25 mg  BID


 PO  3/2/18 21:00


 4/1/18 20:59  3/2/18 20:30


25 MG


 


 Clopidogrel


 Bisulfate


  (plAVix TAB)  75 mg  DAILY


 PO  3/3/18 09:00


 4/2/18 08:59   


 


 


 Digoxin


  (Lanoxin Tab)  0.125 mg  DAILY


 PO  3/3/18 09:00


 4/2/18 08:59   


 


 


 Isosorbide


 Mononitrate


  (Imdur Ext Rel


 Tab)  60 mg  BID@0900,1200


 PO  3/3/18 09:00


 4/2/18 08:59   


 


 


 Lisinopril


  (Zestril Tab)  20 mg  DAILY@1200


 PO  3/2/18 14:15


 4/1/18 14:14  3/2/18 17:41


20 MG


 


 Rosuvastatin


 Calcium


  (Crestor Tab)  10 mg  DAILY


 PO  3/3/18 09:00


 4/2/18 08:59   


 


 


 Pantoprazole


 Sodium


  (Protonix Tab)  40 mg  QAM


 PO  3/3/18 09:00


 4/2/18 08:59   


 


 


 Guaifenesin


  (Mucinex Contr


 Rel Tab)  1,200 mg  Q12


 PO  3/2/18 21:00


 4/1/18 20:59  3/2/18 20:30


1,200 MG


 


 Furosemide 40 mg/


 Syringe  4 ml @ 4


 mls/min  QAM


 IV  3/3/18 09:00


 4/2/18 08:59   


 


 


 Potassium Chloride


  (Klor-Con Tab)  20 meq  BID


 PO  3/2/18 21:00


 4/1/18 20:59  3/2/18 20:30


20 MEQ


 


 Magnesium Oxide


  (Mag-Ox Tab)  400 mg  BID


 PO  3/2/18 21:00


 4/1/18 20:59  3/2/18 20:30


400 MG











Physical Exam





Vital Signs Past 12 Hours








  Date Time  Temp Pulse Resp B/P (MAP) Pulse Ox O2 Delivery O2 Flow Rate FiO2


 


3/2/18 20:08 36.6 64 22 118/65 (82) 95 Room Air  


 


3/2/18 20:05     93 Nasal Cannula 2.0 


 


3/2/18 19:16  79 18  93 Room Air  


 


3/2/18 16:39     93 Nasal Cannula 2.0 


 


3/2/18 16:35 36.9 68 22 141/65 (90) 93 Nasal Cannula 2.0 


 


3/2/18 13:56     91 Nasal Cannula 2.0 


 


3/2/18 13:52 36.3 57 22 143/68    


 


3/2/18 13:41  68 18 147/73 93   


 


3/2/18 12:15  68 18 155/82 93 Nasal Cannula 2.0 


 


3/2/18 11:11  67 18 166/82 93 Nasal Cannula 2.0 


 


3/2/18 10:11     94 Nasal Cannula 2.0 


 


3/2/18 10:10     88 Room Air  


 


3/2/18 09:29  65      


 


3/2/18 09:25     95 Room Air  


 


3/2/18 09:01 36.9 72 20 160/81 94 Room Air  








The patient is alert and oriented. Mood and affect appeared normal. He answered 

all questions appropriately.


HEENT:  Pupils are equal and reactive to light and accommodation.  Extraocular 

movements are intact. The sclerae are anicteric.


Neuro:  Cranial nerves intact


Neck:  Patient's neck is supple.  He has palpable carotid pulses bilaterally 

without bruits on auscultation.  There is no evidence of jugular venous 

distention. The thyroid is not enlarged. 


Lungs:  He has somewhat diminished breath sounds reduced excursion.  There are 

no inspiratory rales but does have significant expiratory wheezing and upper 

airway congestion..


Cardiac:  Heart demonstrates a regular rate and rhythm.  Normal S1 and S2. No 

murmurs on examination.


Pulses:  The patient has palpable radial pulses bilaterally that are equal in 

intensity


Extremities:  There was no evidence of hypoperfusion.  There is no cyanosis or 

clubbing.  There is no edema.


Skin:  I did not appreciate any rashes on examination today.





Data


Laboratory Results:





Last 24 Hours








Test


  3/2/18


09:20 3/2/18


09:35 3/2/18


11:50 3/2/18


18:04


 


White Blood Count 9.78 K/uL    


 


Red Blood Count 4.41 M/uL    


 


Hemoglobin 13.8 g/dL    


 


Hematocrit 40.8 %    


 


Mean Corpuscular Volume 92.5 fL    


 


Mean Corpuscular Hemoglobin 31.3 pg    


 


Mean Corpuscular Hemoglobin


Concent 33.8 g/dl 


  


  


  


 


 


Platelet Count 113 K/uL    


 


Mean Platelet Volume 11.4 fL    


 


Neutrophils (%) (Auto) 86.5 %    


 


Lymphocytes (%) (Auto) 6.5 %    


 


Monocytes (%) (Auto) 6.4 %    


 


Eosinophils (%) (Auto) 0.1 %    


 


Basophils (%) (Auto) 0.0 %    


 


Neutrophils # (Auto) 8.45 K/uL    


 


Lymphocytes # (Auto) 0.64 K/uL    


 


Monocytes # (Auto) 0.63 K/uL    


 


Eosinophils # (Auto) 0.01 K/uL    


 


Basophils # (Auto) 0.00 K/uL    


 


RDW Standard Deviation 50.7 fL    


 


RDW Coefficient of Variation 15.1 %    


 


Immature Granulocyte % (Auto) 0.5 %    


 


Immature Granulocyte # (Auto) 0.05 K/uL    


 


Prothrombin Time 11.1 SECONDS    


 


Prothromb Time International


Ratio 1.1 


  


  


  


 


 


Activated Partial


Thromboplast Time 25.5 SECONDS 


  


  


  


 


 


Partial Thromboplastin Ratio 1.0    


 


Sodium Level 143 mmol/L    


 


Potassium Level 3.3 mmol/L    3.3 mmol/L 


 


Chloride Level 109 mmol/L    


 


Carbon Dioxide Level 29 mmol/L    


 


Anion Gap 5.0 mmol/L    


 


Blood Urea Nitrogen 19 mg/dl    


 


Creatinine 1.17 mg/dl    


 


Est Creatinine Clear Calc


Drug Dose 58.1 ml/min 


  


  


  


 


 


Estimated GFR (


American) 72.3 


  


  


  


 


 


Estimated GFR (Non-


American 62.4 


  


  


  


 


 


BUN/Creatinine Ratio 16.2    


 


Random Glucose 80 mg/dl    


 


Calcium Level 8.4 mg/dl    


 


Magnesium Level 2.0 mg/dl    


 


Total Bilirubin 0.4 mg/dl    


 


Aspartate Amino Transf


(AST/SGOT) 45 U/L 


  


  


  


 


 


Alanine Aminotransferase


(ALT/SGPT) 67 U/L 


  


  


  


 


 


Alkaline Phosphatase 76 U/L    


 


Troponin I < 0.015 ng/ml    < 0.015 ng/ml 


 


Pro-B-Type Natriuretic Peptide 7865 pg/ml    


 


Total Protein 5.8 gm/dl    


 


Albumin 3.0 gm/dl    


 


Globulin 2.8 gm/dl    


 


Albumin/Globulin Ratio 1.1    


 


Bedside D-Dimer  > 450 ng/mlFEU   


 


Digoxin Level   0.7 ng/ml  


 


Vitamin B12 Level    435 pg/mL 


 


Folate    10.53 ng/mL 


 


Thyroid Stimulating Hormone


(TSH) 


  


  


  0.422 uIu/ml 


 








Imaging:  Patient underwent chest x-ray as well as CT PE protocol both which 

demonstrated an element of pulmonary vascular congestion





EKG:  Normal sinus rhythm with right bundle branch block, left anterior 

fascicular block and possible old anterior MI





Telemetry reviewed:  Normal sinus rhythm





 Echocardiogram performed today revealed mildly reduced LV systolic function 

with estimated ejection fraction of 40-45 percent.  Moderate aortic 

regurgitation.





I performed a complete interrogation of his single-chamber ICD.  Patient did 

have significant rise in OptiVol since mid February.  He has high pacing 

threshold on the right ventricular lead.  He has no significant ventricular 

pacing.  No events have been recorded. No therapies delivered.





Assessment & Plan


1. Acute decompensated left ventricular systolic failure:  Patient is known to 

have element of LV systolic failure.  Generally speaking he is fairly well 

compensated.  However recently he did experience pulmonary vascular congestion 

and associated symptoms.  This is likely related to his prednisone use and 

dietary indiscretion.  He does report higher sodium intake recently.  

Interestingly, his thoracic impedance has been lower since mid February 

suggesting a more gradual development of pulmonary congestion.  He may in fact 

have had an element of this leading up to his recent diagnosis of bronchitis.  

He seems to have diuresed quite well with a single dose of intravenous Lasix.  

His examination does not suggest continued pulmonary edema. He feels much 

better and has no orthopnea.  I think he can probably return to an oral dose of 

Lasix.  He may in fact simply take his daily dose of 20 milligrams while he is 

still on prednisone.





2.  Coronary artery disease:  No current symptoms suggestive of coronary 

insufficiency or angina.  Patient has been maintained on oral nitrates and 

Plavix as an alternative to aspirin.  He is also on high-dose rosuvastatin.





3. Ischemic cardiomyopathy:  Patient is on beta-blocker and lisinopril.  Is 

also being maintained on digoxin.





4. Valvular heart disease:  Patient has an element of aortic insufficiency 

classified as moderate.  I doubt this played a role in his decompensation.  

This can be monitored over time.





5. Tobacco abuse:  Patient will be counseled to discontinue tobacco use.

## 2018-03-02 NOTE — DIAGNOSTIC IMAGING REPORT
CHEST ONE VIEW PORTABLE



CLINICAL HISTORY: EVALUATE RESPIRATORY DISTRESS.DYSPNEA    



COMPARISON STUDY:  Chest radiograph February 26, 2018.



FINDINGS: A single lead left subclavian pacer/AICD is in place. There is no

pneumothorax. There are trace bilateral pleural effusions. Interstitial edema

has developed since previous exam of February 26, 2018. Moderate cardiomegaly is

noted. 



IMPRESSION: Interval development of interstitial pulmonary edema. Trace

bilateral pleural effusions.







Electronically signed by:  Jose Carlos Vallejo M.D.

3/2/2018 9:40 AM



Dictated Date/Time:  3/2/2018 9:38 AM

## 2018-03-02 NOTE — DIAGNOSTIC IMAGING REPORT
CT ANGIOGRAM OF THE CHEST



CLINICAL HISTORY: Atypical chest pain and shortness of breath    



COMPARISON STUDY:  Chest x-ray dated 3-18, CT scan dated 11/10/2016 



TECHNIQUE: Following the IV administration of 94 mL of Optiray-320, CT angiogram

of the thorax was performed from the thoracic inlet to the lung bases utilizing

the pulmonary embolus protocol. Images are reviewed in the axial, sagittal, and

coronal planes. IV contrast was administered without complication. MIP imaging

was performed.  A dose lowering technique was utilized adhering to the

principles of ALARA.





CT DOSE: 315.18 mGy.cm



FINDINGS:



There are mildly enlarged paratracheal, AP window, subcarinal, and right hilar

lymph nodes.



There was no evidence of thoracic aortic dilatation. There are coronary artery

calcifications present.



There were no pulmonary artery filling defects to indicate acute pulmonary

embolism.



There are small bilateral pleural effusions



There is moderate respiratory motion artifact. There is interlobular septal

edema. There is mild perihilar bronchial wall thickening. There are no areas of

consolidation to indicate a pneumonia.



IMPRESSION:  

1. Examination limited due to respiratory motion artifact

2. No evidence of acute pulmonary embolism

3. CT findings suggestive of pulmonary edema with small bilateral pleural

effusions and interlobular septal edema

4. Mild perihilar bronchial wall thickening

5. Mild mediastinal and right hilar adenopathy







Electronically signed by:  Puneet Parish M.D.

3/2/2018 10:49 AM



Dictated Date/Time:  3/2/2018 10:45 AM

## 2018-03-03 VITALS
DIASTOLIC BLOOD PRESSURE: 64 MMHG | SYSTOLIC BLOOD PRESSURE: 134 MMHG | TEMPERATURE: 97.88 F | OXYGEN SATURATION: 91 % | HEART RATE: 76 BPM

## 2018-03-03 VITALS
OXYGEN SATURATION: 99 % | TEMPERATURE: 97.88 F | DIASTOLIC BLOOD PRESSURE: 86 MMHG | HEART RATE: 67 BPM | SYSTOLIC BLOOD PRESSURE: 167 MMHG

## 2018-03-03 VITALS
HEART RATE: 87 BPM | SYSTOLIC BLOOD PRESSURE: 167 MMHG | DIASTOLIC BLOOD PRESSURE: 86 MMHG | TEMPERATURE: 97.88 F | OXYGEN SATURATION: 99 %

## 2018-03-03 VITALS — HEART RATE: 73 BPM | OXYGEN SATURATION: 94 %

## 2018-03-03 VITALS — HEART RATE: 76 BPM | OXYGEN SATURATION: 93 %

## 2018-03-03 LAB
BUN SERPL-MCNC: 27 MG/DL (ref 7–18)
CALCIUM SERPL-MCNC: 8.7 MG/DL (ref 8.5–10.1)
CO2 SERPL-SCNC: 31 MMOL/L (ref 21–32)
CREAT SERPL-MCNC: 1.37 MG/DL (ref 0.6–1.4)
GLUCOSE SERPL-MCNC: 179 MG/DL (ref 70–99)
POTASSIUM SERPL-SCNC: 3.5 MMOL/L (ref 3.5–5.1)
SODIUM SERPL-SCNC: 142 MMOL/L (ref 136–145)

## 2018-03-03 RX ADMIN — CARVEDILOL SCH MG: 25 TABLET, FILM COATED ORAL at 09:31

## 2018-03-03 RX ADMIN — NITROGLYCERIN SCH INCH: 20 OINTMENT TOPICAL at 09:34

## 2018-03-03 RX ADMIN — NITROGLYCERIN SCH INCH: 20 OINTMENT TOPICAL at 02:47

## 2018-03-03 RX ADMIN — LISINOPRIL SCH MG: 20 TABLET ORAL at 11:01

## 2018-03-03 RX ADMIN — ISOSORBIDE MONONITRATE SCH MG: 60 TABLET ORAL at 09:30

## 2018-03-03 RX ADMIN — ALBUTEROL SULFATE SCH MG: 2.5 SOLUTION RESPIRATORY (INHALATION) at 11:11

## 2018-03-03 RX ADMIN — POTASSIUM CHLORIDE SCH MEQ: 1500 TABLET, EXTENDED RELEASE ORAL at 11:24

## 2018-03-03 RX ADMIN — ISOSORBIDE MONONITRATE SCH MG: 60 TABLET ORAL at 11:24

## 2018-03-03 RX ADMIN — GUAIFENESIN SCH MG: 600 TABLET, EXTENDED RELEASE ORAL at 09:29

## 2018-03-03 RX ADMIN — Medication SCH MG: at 09:30

## 2018-03-03 RX ADMIN — ALBUTEROL SULFATE SCH MG: 2.5 SOLUTION RESPIRATORY (INHALATION) at 07:03

## 2018-03-03 NOTE — DISCHARGE INSTRUCTIONS
Discharge Instructions


Date of Service


Mar 3, 2018.





Admission


Reason for Admission:  Trouble Breathing





Discharge


Discharge Diagnosis / Problem:  Congestive heart failure and COPD - improving





Discharge Goals


Goal(s):  Learn about illness, Diagnostic testing, Therapeutic intervention





Activity Recommendations


Activity Limitations:  as noted below


Since your breathing is not 100% back to normal would suggest avoiding heavy, 

strenuous activities for the next week.  


.





Instructions / Follow-Up


Instructions / Follow-Up





From Dr. Nuñez:





1.  Congestive Heart failure instructions -





Call your Primary Care doctor or cardiologist if any of the following symptoms 

or problems start or get worse:





* Shortness of breath or difficulty breathing


* Wake up at night short of breath


* Chest pain


* Cough


* Swelling of your hands, feet, or legs


* More fatigued or tired with your normal activity


* Palpitations - sudden fast heart beats





WEIGHT





* Weigh yourself every morning after using the bathroom.


* Use the same scale.


* Wear the same amount of clothing.


* Write your weight down on a chart.





Call your Primary Care doctor or cardiologist if you gain more than 2-3 pounds 

in 1-2 days.


This is a sign that you may be taking on fluid weight from the heart failure.  


If you do start to see weight gain please restart your lasix and call your 

doctors. 








MEDICATIONS





* Use this discharge instruction sheet for medication instructions.


* Take your medications at the time your doctor ordered.


* Do not skip a dose of your medicines.


* If you miss a dose of medicine, take it as soon as possible, but DO NOT 

DOUBLE A DOSE.


* Read your medicine information when you get home.


* Know all of the side effects of your medicine.  If in doubt, ask your 

pharmacist


* Call your Primary Care doctor's office if you have any side effects.


* Be sure all of your doctors know what medicine and herbs you take (including 

cold, flu, and herbal medicine).








Take the following with you to your follow-up doctor appointments:





* Weight Chart


* Medication List


* List of questions





Do not drink excessive alcohol, beer or wine.











Note that your echocardiogram showed improvement in your ejection fraction to 40

-45%.








Starting Sunday, 3/4/18, please take 1 tablet of lasix 20mg every morning until 

your prednisone is complete (total of 7 days).


Please take a potassium supplement with the lasix as well.  I have provided you 

with new prescriptions for the lasix and potassium.   





Once the lasix course is completed in 7 days just go back to using the lasix AS 

NEEDED.


I would recommend you use the potassium AS NEEDED after the 7-day cj as well.

    








2.  COPD - please do the following - 


* RESTART spiriva 1 inhalation daily - new prescription provided


* START advair 1 puff twice a day; rinse your mouth with water after each use - 

new prescription provided


* TAKE doxycycline 100mg twice a day for 7 days - prescription provided


 * this antibiotic can cause heartburn


 * it can also cause a rash if you go out in the sun


 * while on the antibiotic be sure to cover up and use sunscreen


* TAKE prednisone for 7 days starting TODAY (it is a 7-day taper) - 

prescription provided 


* USE your nebulizer machine 3-4 times a day for the next 5-7 days as your COPD 

gets better


* MAY use over-the-counter mucinex up to 1200mg twice a day as needed for cough








3.  HOLD YOUR LISINOPRIL TODAY ONLY.  YOU CAN RESUME IT ON SUNDAY, 3/4/2018. 








4.  Follow-up -


* [facility]


* see Dr. Kocher or any of the [facility] Cardiologists within 1 week 





5.  Return to [facility] if -


* you develop recurrent chest pain


* you develop worsening shortness of breath


* you see rapidly rising weights despite your lasix 


* you see worsening swelling in your legs, especially if associated with 

breathing difficulty


* any other concerns





Current Hospital Diet


Patient's current hospital diet: Low Sodium Diet (2gm Na)





Discharge Diet


Recommended Diet:  Low Sodium Diet (2gm Na)


Fluid Restriction:  1500 ml (6 cups)





Procedures


Procedures Performed:  


CAT scan of the lungs showing congestive heart failure





Echocardiogram showing ejection fraction of 40-45% (this is improved in


comparison to 1 year ago)





Pending Studies


Studies pending at discharge:  no





Medical Emergencies








.


Who to Call and When:





Call 911 or go to the Emergency Room if:





* If at any time you feel your situation is an emergency


* You have tightness or pain in your chest that does not go away with rest or 

Nitroglycerin


* You are very short of breath even with rest





.





Non-Emergent Contact


Non-Emergency issues call your:  Primary Care Provider, Cardiologist


Call Non-Emergent contact if:  temperature is above 100.5, you have any 

medication questions





.


.








"Provider Documentation" section prepared by Shorty Nuñez.








.

## 2018-03-03 NOTE — DISCHARGE SUMMARY
Discharge Summary


Date of Service


Mar 3, 2018.





Discharge Summary


Admission Date:


Mar 2, 2018 at 12:02


Discharge Date:  Mar 3, 2018


Discharge Disposition:  Home


Principal Diagnosis:  acute/chronic systolic/diastolic CHF


Problems/Secondary Diagnoses:


1.  COPD exacerbation 


2.  CAD s/p MI - 2007 - 2 stents


3.  HTN


4.  tobacco dependence


5.  pacemaker/ICD status


6.  hyperlipidemia


7.  mild thrombocytopenia 


8.  hypokalemia - resolved


Immunizations:  


   Have You Had Influenza Vaccine:  Yes


   Influenza Vaccine Date:  Nov 13, 2012


   History of Tetanus Vaccine?:  utd


   History of Pneumococcal:  Yes


   Pneumococcal Date:  Nov 13, 2006


   History of Hepatitis B Vaccine:  Unknown


Procedures:


1.  echocardiogram - 


* -- Conclusions --


* The left ventricle is mildly dilated.


* Left ventricular systolic function is mildly reduced.  EF 40-45%.


* Grade I diastolic dysfunction, (abnormal relaxation pattern).


* Moderate aortic regurgitation.


* Right ventricular systolic pressure is normal.


* Compared to study from 2013, the overall LV function appears improved.





2.  CTA chest - 


IMPRESSION:  


1. Examination limited due to respiratory motion artifact


2. No evidence of acute pulmonary embolism


3. CT findings suggestive of pulmonary edema with small bilateral pleural 

effusions and interlobular septal edema


4. Mild perihilar bronchial wall thickening


5. Mild mediastinal and right hilar adenopathy


Consultations:


cardiology - Christopher Kocher, MD





Medication Reconciliation


New Medications:  


Doxycycline Hyclate (Doxycycline Hyclate) 100 Mg Cap


100 MG PO BID for 7 Days, #14 CAP 0 Refills





Fluticasone Prop/Salmeterol (Advair Diskus 250/50 60 Dose) 1 Ea Aerp


1 PUFF INH BID, #1 INHALER 5 Refills


rinse mouth after use with water


Potassium Chloride (Klor-Con M20) 20 Meq Tabcr


20 MEQ PO QAM PRN for when you take lasix, #30 TABS 2 Refills





Prednisone (Prednisone) 10 Mg Tab


10 MG PO AS DIRECTED, #15 TAB 0 Refills


start 3/3/18: take 3 tabs days 1-3, 2 tabs days 4/5, 1 tab days 6/7.


Tiotropium Bromide (Spiriva Handihaler) 5 Puff/90 Mcg Aerp


1 PUFF PO DAILY, #1 INHALER 5 Refills





Guaifenesin Ext Rel (Mucinex Ext Rel) 600 Mg Tabcr


1200 MG PO Q12, #30 TABS


purchase over-the-counter


 


Changed Medications:  


Furosemide (Lasix) 20 Mg Tab


20 MG PO QAM PRN for swelling, #30 TAB 1 Refill (Changed from: PRN; Refills: )





 


Continued Medications:  


Albuterol Sulf (Proventil 0.083% 2.5MG/3ML) 2.5 Mg/3 Ml Nebu


2.5 MG INH QID PRN for SOB/Wheezing, EA





Carvedilol (Coreg) 25 Mg Tab


25 MG PO BID, TAB





Clopidogrel (Plavix) 75 Mg Tab


75 MG PO DAILY





Digoxin (Digoxin) 0.125 Mg Tab


0.125 MG PO DAILY





Isosorbide Mononitrate Ext Rel (Imdur Ext Rel) 60 Mg Ertab


60 MG PO qam & noon





Lisinopril (Zestril) 20 Mg Tab


20 MG PO daily @ 1200


AT NOON


Nitroglycerin (Nitrostat) 0.4 Mg Tab


0.4 MG UT PRN, BTL





Omeprazole (Prilosec) 20 Mg Capcr


1 TAB PO DAILY





Rosuvastatin Calcium (Crestor) 10 Mg Tab


10 MG PO DAILY, TAB











Referrals At Discharge


Follow up Referrals:  


Cardiologist Referral - Within 1 Week with Kocher, Christopher W., MD








Discharge Exam


Physical Exam:  


   General Appearance:  no apparent distress


   ENT:  pharynx normal


   Neck:  no JVD


   Respiratory/Chest:  no respiratory distress, no accessory muscle use, + 

wheezing, + pertinent finding (no rales)


   Cardiovascular:  regular rate, rhythm, no gallop, no murmur, normal 

peripheral pulses


   Abdomen / GI:  normal bowel sounds, non tender, soft, no organomegaly


   Extremities:  no pedal edema


   Neurologic/Psychiatric:  alert, oriented x 3





Hospital Course





HISTORY OF PRESENT ILLNESS:


71yo male with ischemic cardiomyopathy with resulting chronic systolic/

diastolic CHF (last known EF about 30%), CAD s/p MI, ICD/pacemaker status, COPD

, and ongoing tobacco dependence who presented with worsening dyspnea despite 

treatment of a COPD exacerbation.  He had been taking prednisone for several 

days prior to admission.


CXR and clinical picture were most c/w acute/chronic CHF at time of 

presentation.








HOSPITAL COURSE: 


The patient had a brisk diuresis during his stay with resolution of nearly all 

pulmonary symptoms except for wheezing.  


He lost about 5kg during his brief stay.  


O2 was easily weaned off. 


Despite a complaint of chest discomfort at time of admission serial troponins 

were negative and EKG did not show ischemic changes. 





He underwent repeat echocardiogram which showed evidence of ischemic 

cardiomyopathy with EF of 40-45% and diastolic dysfunction.  His EF was 

improved relative to his past echo in 2017 which, by report, showed EF of 30%.  





He was seen in consult by Dr. Christopher Kocher, [facility] cardiology.


Arrangements will be made for the patient to follow up with Dr. Kocher or one 

of his associates within 1-2 weeks of discharge.  





The patient will continue on beta blocker and ACE.  Lasix will be used on a PRN 

basis.


(However, since the patient will be going home on a course of prednisone for 

his COPD, we asked that he take the lasix every day that he takes the 

prednisone (7 days worth) following his discharge).





He was counseled on the importance of fluid/salt restriction and daily weights.

  He was counseled to quit smoking. 





Despite resolution of his pulmonary edema, the patient continued with cough/

congestion/wheezing - all consistent with ongoing COPD exacerbation.


The following were recommended - 


* resuming spiriva 1 puff daily


* starting advair 250/50 1 puff twice a day


* prednisone taper x 7 days (low-dose)


* doxycycline 100mg twice a day for 7 days 


* use of duonebs q6h as needed for wheeze/cough





Lastly, the patient had mild thrombocytopenia.  B12, folate, and TSH were all 

found to be normal.  


It appears the low platelets are chronic dating back several years.


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  


Periodic testing and follow-up is recommended to ensure stability.


Total Time Spent:  Greater than 30 minutes


This includes examination of the patient, discharge planning, medication 

reconciliation, and communication with other providers.





Discharge Instructions


Please refer to the electronic Patient Visit Report (Discharge Instructions) 

for additional information.





Follow-Up


1.  see PCP within 1 week


2.  see Dr. Christopher Kocher or any provider within [facility] 

within 1-2 weeks





Additional Copies To


Kocher, Christopher W., MD; [facility]

## 2018-04-19 ENCOUNTER — HOSPITAL ENCOUNTER (EMERGENCY)
Dept: HOSPITAL 45 - C.EDB | Age: 73
Discharge: HOME | End: 2018-04-19
Payer: COMMERCIAL

## 2018-04-19 VITALS — SYSTOLIC BLOOD PRESSURE: 105 MMHG | DIASTOLIC BLOOD PRESSURE: 62 MMHG | OXYGEN SATURATION: 95 % | HEART RATE: 62 BPM

## 2018-04-19 VITALS
BODY MASS INDEX: 27.18 KG/M2 | HEIGHT: 65 IN | BODY MASS INDEX: 27.18 KG/M2 | HEIGHT: 65 IN | WEIGHT: 163.14 LBS | WEIGHT: 163.14 LBS

## 2018-04-19 VITALS — TEMPERATURE: 97.16 F

## 2018-04-19 DIAGNOSIS — I50.9: ICD-10-CM

## 2018-04-19 DIAGNOSIS — Z83.3: ICD-10-CM

## 2018-04-19 DIAGNOSIS — Z82.49: ICD-10-CM

## 2018-04-19 DIAGNOSIS — Z79.899: ICD-10-CM

## 2018-04-19 DIAGNOSIS — I51.9: ICD-10-CM

## 2018-04-19 DIAGNOSIS — F17.200: ICD-10-CM

## 2018-04-19 DIAGNOSIS — M54.16: Primary | ICD-10-CM

## 2018-04-19 DIAGNOSIS — R20.0: ICD-10-CM

## 2018-04-19 NOTE — DIAGNOSTIC IMAGING REPORT
LUMBAR SPINE WITHOUT



CLINICAL HISTORY: 72 years-old Male presenting with Low back pain, radiating

down legs. 



TECHNIQUE: Multidetector CT of the lumbar spine was performed without the use of

intravenous contrast. IV contrast: None. A dose lowering technique was used

consistent with the principles of ALARA (as low as reasonably achievable).



COMPARISON: CT of abdomen and pelvis from 9/20/2013.



CT DOSE (mGy.cm): The estimated cumulative dose is 919.76 mGy.cm.



FINDINGS:



 topogram: Vasectomy clips noted.



Osteopenia. No significant scoliosis. Normal lumbar lordosis. Anterior vertebral

body height loss with significant superior endplate concavity of L1. This

deformity is unchanged from prior CT. No acute fracture or subluxation. Lucent

lesion in the L3 vertebral body along the superior endplate may relate to

degenerative cystic change. Intervertebral disc heights preserved though vacuum

disc phenomenon noted at several levels. Suggestion of disc bulges to varying

degrees at every level. No significant osseous neural foraminal narrowing though

disc bulges may narrow the neural foramina. No osseous spinal canal narrowing.

Paraspinal musculature within normal limits. Atherosclerosis noted. Visualized

portion of the sacrum is intact. Degenerative changes of the sacroiliac joints.



IMPRESSION:

1.  No acute osseous injury of the lumbar spine.



2.  Stable deformity of L1.



3.  Multilevel degenerative changes with disc bulges suggested at nearly every

level. Soft tissue narrowing of neural foramina may be present. This be better

demonstrated on MR. 



4.  Osteopenia.







Electronically signed by:  Rojas Almanza M.D.

4/19/2018 2:16 PM



Dictated Date/Time:  4/19/2018 2:11 PM

## 2018-04-19 NOTE — DIAGNOSTIC IMAGING REPORT
PELVIS CT



CT DOSE:    



HISTORY: L leg pain.  Low back pain



TECHNIQUE: Multiaxial CT images of the pelvis were performed and reformatted in

the sagittal and coronal plane without the use of contrast.  A dose lowering

technique was utilized adhering to the principles of ALARA.



COMPARISON:  None.



FINDINGS: No fracture or dislocation within the pelvis or hips. The sacrum

appears intact. Mild osteoarthritis within the bilateral hips. Please refer to

the dedicated lumbar spine CT for further evaluation of the lower lumbar spine.

Colonic diverticulosis.



IMPRESSION:  

No fracture or dislocation within the pelvis or hips.







Electronically signed by:  Calixto Harvey M.D.

4/19/2018 2:17 PM



Dictated Date/Time:  4/19/2018 2:12 PM

## 2018-04-19 NOTE — EMERGENCY ROOM VISIT NOTE
History


First contact with patient:  12:47


Chief Complaint:  BACK PAIN


Stated Complaint:  LOWER BACK PAIN





History of Present Illness


The patient is a 72 year old male who presents to the Emergency Room via 

private vehicle with complaints of "lower back pain".  The patient states that 

he has been experiencing low back pain that began about a week ago.  He states 

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  He 

states that he has been trying ibuprofen with some relief.  He notes that he is 

now having shooting pain down the right and left leg at times.  He questions if 

this could be from the sciatic nerve.  There is no known trauma or injury.  He 

states that last night it became worse with walking.  It is worse with certain 

positions and body movements.  He describes the pain as a sharp knife such as a 

stabbing sensation of which he rates numerically as a 10/10.  He notes minimal 

numbness in the right thigh and calf after the shooting pain occurs.  He denies 

any lower extremity weakness, bowel or bladder incontinence, or numbness or 

tingling in the genital region.  He denies any fevers, chills, nausea, vomiting

, chest pain, shortness of breath, abdominal pain.





Review of Systems


A complete 10-point Review of Systems was discussed with the patient, with 

pertinent positives and negatives listed in the History of Present Illness. All 

remaining Review of Systems questions can be considered negative unless 

otherwise specified.





Past Medical/Surgical History


Medical Problems:


(1) Acute on chronic systolic (congestive) heart failure


(2) Acute respiratory failure


(3) Emphysema lung


(4) H/O cardiac pacemaker


(5) Heart attack


(6) Heart disease


(7) History of - hypertension








Family History





Diabetes mellitus


FH: heart disease


Hypertension





Social History


Smoking Status:  Current Every Day Smoker


Alcohol Use:  none


Drug Use:  none


Marital Status:  


Housing Status:  lives with family


Occupation Status:  retired





Current/Historical Medications


Scheduled


Carvedilol (Coreg), 25 MG PO BID


Clopidogrel (Plavix), 75 MG PO DAILY


Digoxin (Digoxin), 0.125 MG PO DAILY


Doxycycline Hyclate (Doxycycline Hyclate), 100 MG PO BID


Fluticasone Prop/Salmeterol (Advair Diskus 250/50 60 Dose), 1 PUFF INH BID


Isosorbide Mononitrate Ext Rel (Imdur Ext Rel), 60 MG PO qam & noon


Lisinopril (Zestril), 20 MG PO daily @ 1200


Nitroglycerin (Nitrostat), 0.4 MG UT PRN


Omeprazole (Prilosec), 1 TAB PO DAILY


Rosuvastatin Calcium (Crestor), 10 MG PO DAILY


Tiotropium Bromide (Spiriva Handihaler), 1 PUFF PO DAILY





Scheduled PRN


Albuterol Sulf (Proventil 0.083% 2.5MG/3ML), 2.5 MG INH QID PRN for SOB/Wheezing


Furosemide (Lasix), 20 MG PO QAM PRN for swelling


Potassium Chloride (Klor-Con M20), 20 MEQ PO QAM PRN for when you take lasix


Tramadol (Ultram), 1 TAB PO TID PRN for Pain





Physical Exam


Vital Signs











  Date Time  Temp Pulse Resp B/P (MAP) Pulse Ox O2 Delivery O2 Flow Rate FiO2


 


4/19/18 14:38  62 20 105/62 95 Room Air  


 


4/19/18 12:32 36.2 51 16 117/64 97 Room Air  











Physical Exam


VITAL SIGNS - Vital signs and nursing notes were reviewed.  Stable.


GENERAL -72-year-old male appearing his stated age who is in no acute distress. 

Communicates well with provider and answers questions appropriately.


SKIN - Without rashes.  No meningeal or petechial rash.


HEAD - NC/AT.


EYES - PERRL with EOMI bilaterally. Sclera anicteric. 


ABDOMEN - Abdominal contour normal without pulsations or visible masses. BS 

normoactive all four quadrants. No tenderness, palpable masses, 

hepatosplenomegaly, or ascites noted.


EXTREMITIES - No clubbing or peripheral cyanosis. No pretibial edema present.  

He is neurovascularly intact in the lower extremities.  Essentially negative 

straight leg raise.  +5/5 strength noted in UE/LE bilaterally.  Patient is able 

to axially load.  Patient is able to ambulate.


NEUROLOGIC - Cranial nerves II through XII grossly intact. Sensory intact to 

light touch throughout. Patellar reflexes +2/4.


PSYCH - A&O, and cooperates fully with examiner. Pt is very pleasant and 

interacts well with examiner.





Medical Decision & Procedures


ER Provider


Diagnostic Interpretation:


LUMBAR SPINE WITHOUT





CLINICAL HISTORY: 72 years-old Male presenting with Low back pain, radiating


down legs. 





TECHNIQUE: Multidetector CT of the lumbar spine was performed without the use of


intravenous contrast. IV contrast: None. A dose lowering technique was used


consistent with the principles of ALARA (as low as reasonably achievable).





COMPARISON: CT of abdomen and pelvis from 9/20/2013.





CT DOSE (mGy.cm): The estimated cumulative dose is 919.76 mGy.cm.





FINDINGS:





 topogram: Vasectomy clips noted.





Osteopenia. No significant scoliosis. Normal lumbar lordosis. Anterior vertebral


body height loss with significant superior endplate concavity of L1. This


deformity is unchanged from prior CT. No acute fracture or subluxation. Lucent


lesion in the L3 vertebral body along the superior endplate may relate to


degenerative cystic change. Intervertebral disc heights preserved though vacuum


disc phenomenon noted at several levels. Suggestion of disc bulges to varying


degrees at every level. No significant osseous neural foraminal narrowing though


disc bulges may narrow the neural foramina. No osseous spinal canal narrowing.


Paraspinal musculature within normal limits. Atherosclerosis noted. Visualized


portion of the sacrum is intact. Degenerative changes of the sacroiliac joints.





IMPRESSION:


1.  No acute osseous injury of the lumbar spine.





2.  Stable deformity of L1.





3.  Multilevel degenerative changes with disc bulges suggested at nearly every


level. Soft tissue narrowing of neural foramina may be present. This be better


demonstrated on MR. 





4.  Osteopenia.











Electronically signed by:  Rojas Almanza M.D.


4/19/2018 2:16 PM





Dictated Date/Time:  4/19/2018 2:11 PM





PELVIS CT





CT DOSE:    





HISTORY: L leg pain.  Low back pain





TECHNIQUE: Multiaxial CT images of the pelvis were performed and reformatted in


the sagittal and coronal plane without the use of contrast.  A dose lowering


technique was utilized adhering to the principles of ALARA.





COMPARISON:  None.





FINDINGS: No fracture or dislocation within the pelvis or hips. The sacrum


appears intact. Mild osteoarthritis within the bilateral hips. Please refer to


the dedicated lumbar spine CT for further evaluation of the lower lumbar spine.


Colonic diverticulosis.





IMPRESSION:  


No fracture or dislocation within the pelvis or hips.











Electronically signed by:  Calixto Harvey M.D.


4/19/2018 2:17 PM





Dictated Date/Time:  4/19/2018 2:12 PM





Medications Administered











 Medications


  (Trade)  Dose


 Ordered  Sig/Rhys


 Route  Start Time


 Stop Time Status Last Admin


Dose Admin


 


 Tramadol HCl


  (Ultram Home


 Pack)  1 homepa  UD  STAT


 PO  4/19/18 14:58


 4/19/18 15:00 DC 4/19/18 14:58


[facility]











Medical Decision


Patient was seen and evaluated as above in room D2. Review was performed of 

nursing notes and vital signs. After obtaining a thorough history and physical 

examination the above work up was performed.  He presents to us today with low 

back pain that radiates down both legs at times.  He is afebrile, and without 

abdominal pain.  No evidence of cauda equina syndrome.  I did elect to obtain 

CT scan of the patient's lumbar spine and pelvis to better evaluate his 

presentation here today.  Results as above.  No acute fracture or dislocation.  

I do suspect degenerative change as well as radicular pain secondary to likely 

disc bulge.  The patient will be managed conservatively with tramadol, and I 

will refrain from using steroids as he notes he was recently to the hospital 

secondary to fluid overload that may have been from steroids and he also is on 

Plavix.  He is to call his family doctor to schedule follow-up.  He is to 

return with worsening.  I do not suspect any emergent process to his 

presentation here today following the workup and evaluation. I did discuss CT 

scan results with patient. [facility] to [facility] and to review 

results with them.  The patient was educated upon management, had questions 

answered prior to discharge, and was discharged home in good condition.





Case was discussed with the attending physician.





I attest that I have personally reviewed the patient medication list.





I attest that I have reviewed the patient's blood pressure and it was found to 

be normal





In the evaluation and treatment of this patient the following differential 

diagnoses were entertained: Sciatica nerve pain, lumbar radiculopathy, fracture

, dislocation, acute intra-abdominal injury, dislocation, malignancy, UTI, 

cauda equina syndrome, among others.





Impression





 Primary Impression:  


 Radicular low back pain





Departure Information


Dispostion


Home / Self-Care





Condition


GOOD





Prescriptions





Tramadol (Ultram) 50 Mg Tab


1 TAB PO TID Y for Pain for 3 Days, #9 TAB


   Prov: Johnnie Banda PA-C         4/19/18





Referrals


[facility] (PCP)





Patient Instructions


My [facility]





Additional Instructions





You have been treated in the Emergency Department for Back Pain. 





You have been prescribed Tramadol to be used for pain control. 1 tablet every 8 

hours as needed for pain. This is a narcotic medication. You cannot drive or 

consume alcohol while on this medicine. This medicine should only be used for 

pain that cannot be controlled with over-the-counter pain medicines.





For pain control, you can use the following over-the-counter medicines (if >11 yo):





- Regular strength (325mg/tab) Tylenol (acetaminophen) 2 tabs every 4-6 hours 

as needed. Do not exceed 12 tablets in a 24 hour period. Avoid taking more than 

3 grams (3000 mg) of Tylenol per day. This includes any other sources of 

acetaminophen you may take on a regular basis.





If this is an acute injury, ice can be applied to the area of pain for the 

first 3 days to help decrease pain and inflammation. After the first 3 days, a 

heating pad can be used over the area for continued soothing relief.





You should schedule a follow-up appointment in 2-3 days with your Primary Care 

Provider for further evaluation and treatment of your back pain.





Return to the Emergency Department if your current symptoms worsen despite 

treatment course outlined above, or if you develop any of the following symptoms

: intractable pain despite aforementioned treatment course, loss of control of 

your bowel or bladder, numbness or tingling in your groin, or development of a 

fever.

## 2018-04-19 NOTE — EMERGENCY ROOM VISIT NOTE
ED Visit Note


First contact with patient:  12:47


The patient was seen and examined with Johnnie Banda PA-C.  I agree with the 

history, physical and findings.  Please see the note for disposition and 

details.